# Patient Record
Sex: FEMALE | Race: WHITE | NOT HISPANIC OR LATINO | Employment: FULL TIME | URBAN - METROPOLITAN AREA
[De-identification: names, ages, dates, MRNs, and addresses within clinical notes are randomized per-mention and may not be internally consistent; named-entity substitution may affect disease eponyms.]

---

## 2023-03-16 DIAGNOSIS — I10 PRIMARY HYPERTENSION: Primary | ICD-10-CM

## 2023-03-16 RX ORDER — AMLODIPINE BESYLATE 5 MG/1
5 TABLET ORAL DAILY
Qty: 90 TABLET | Refills: 1 | Status: SHIPPED | OUTPATIENT
Start: 2023-03-16 | End: 2023-08-18 | Stop reason: SDUPTHER

## 2023-03-16 RX ORDER — LISINOPRIL 20 MG/1
20 TABLET ORAL DAILY
COMMUNITY
End: 2023-03-16 | Stop reason: SDUPTHER

## 2023-03-16 RX ORDER — LISINOPRIL 20 MG/1
20 TABLET ORAL DAILY
Qty: 90 TABLET | Refills: 1 | Status: SHIPPED | OUTPATIENT
Start: 2023-03-16 | End: 2023-08-18 | Stop reason: SDUPTHER

## 2023-03-16 RX ORDER — AMLODIPINE BESYLATE 5 MG/1
5 TABLET ORAL DAILY
COMMUNITY
End: 2023-03-16 | Stop reason: SDUPTHER

## 2023-04-15 LAB
ALLERGEN ANIMAL: CAT DANDER IGE (KU/L): <0.1 KU/L
ALLERGEN ANIMAL: DOG DANDER IGE (KU/L): <0.1 KU/L
ALLERGEN GRASS: BERMUDA GRASS (CYNODON DACTYLON) IGE (KU/L): <0.1 KU/L
ALLERGEN GRASS: JOHNSON GRASS (SORGHUM HALEPENSE) IGE (KU/L): <0.1 KU/L
ALLERGEN GRASS: MEADOW GRASS, KENTUCKY BLUE (POA PRATENSIS )IGE (KU/L): <0.1 KU/L
ALLERGEN GRASS: TIMOTHY GRASS (PHLEUM PRATENSE) IGE (KU/L): <0.1 KU/L
ALLERGEN INSECT: COCKROACH IGE: <0.1 KU/L
ALLERGEN MICROORGANISM: ALTERNARIA ALTERNATA IGE (KU/L): <0.1 KU/L
ALLERGEN MICROORGANISM: ASPERGILLUS FUMIGATUS IGE (KU/L): <0.1 KU/L
ALLERGEN MICROORGANISM: CLADOSPORIUM HERBARUM IGE (KU/L): <0.1 KU/L
ALLERGEN MICROORGANISM: PENICILLIUM CHRYSOGENUM (P. NOTATUM) IGE (KU/L): <0.1 KU/L
ALLERGEN MITE: DERMATOPHAGOIDES FARINAE (HOUSE DUST MITE) IGE (KU/L): <0.1 KU/L
ALLERGEN MITE: DERMATOPHAGOIDES PTERONYSSINUS (HOUSE DUST MITE) IGE (KU/L): <0.1 KU/L
ALLERGEN TREE: BOX-ELDER (ACER NEGUNDO) IGE (KU/L): <0.1 KU/L
ALLERGEN TREE: COMMON SILVER BIRCH (BETULA VERRUCOSA) IGE (KU/L): <0.1 KU/L
ALLERGEN TREE: COTTONWOOD (POPULUS DELTOIDES) IGE (KU/L): <0.1 KU/L
ALLERGEN TREE: ELM (ULMUS AMERICANA) IGE (KU/L): <0.1 KU/L
ALLERGEN TREE: MAPLE LEAF SYCAMORE, LONDON PLANE IGE (KU/L): <0.1 KU/L
ALLERGEN TREE: MOUNTAIN JUNIPER (JUNIPERUS SABINOIDES) IGE (KU/L): <0.1 KU/L
ALLERGEN TREE: MULBERRY (MORUS ALBA) IGE (KU/L): <0.1 KU/L
ALLERGEN TREE: OAK (QUERCUS ALBA) IGE (KU/L): <0.1 KU/L
ALLERGEN TREE: PECAN, HICKORY (CARYA PECAN) IGE (KU/L): <0.1 KU/L
ALLERGEN TREE: WALNUT IGE: <0.1 KU/L
ALLERGEN TREE: WHITE ASH (FRAXINUS AMERICANA) IGE (KU/L): <0.1 KU/L
ALLERGEN WEED: COMMON PIGWEED (AMARANTHUS RETROFLEXUS) IGE (KU/L): <0.1 KU/L
ALLERGEN WEED: COMMON RAGWEED (AMB. ARTEMISIIFOLIA/A. ELATIOR) IGE (KU/L): <0.1 KU/L
ALLERGEN WEED: GOOSEFOOT, LAMB'S QUARTERS (CHENOPODIUM ALBUM) IGE (KU/L): <0.1 KU/L
ALLERGEN WEED: PLANTAIN (ENGLISH), RIBWORT (PLANTAGO LANCEOLATA) IGE (KU/L): <0.1 KU/L
ALLERGEN WEED: PRICKLY SALTWORT/RUSSIAN THISTLE (SALSOLA KALI) IGE (KU/L): <0.1 KU/L
ALLERGEN WEED: SHEEP SORREL (RUMEX ACETOSELLA) IGE (KU/L): <0.1 KU/L
IMMUNOCAP IGE: <2 KU/L (ref 0–214)
IMMUNOCAP INTERPRETATION: NORMAL

## 2023-06-06 LAB
ANION GAP IN SER/PLAS: 12 MMOL/L (ref 10–20)
CALCIUM (MG/DL) IN SER/PLAS: 10.2 MG/DL (ref 8.6–10.6)
CARBON DIOXIDE, TOTAL (MMOL/L) IN SER/PLAS: 28 MMOL/L (ref 21–32)
CHLORIDE (MMOL/L) IN SER/PLAS: 103 MMOL/L (ref 98–107)
CHOLESTEROL (MG/DL) IN SER/PLAS: 242 MG/DL (ref 0–199)
CHOLESTEROL IN HDL (MG/DL) IN SER/PLAS: 106.2 MG/DL
CHOLESTEROL/HDL RATIO: 2.3
CREATININE (MG/DL) IN SER/PLAS: 0.73 MG/DL (ref 0.5–1.05)
ERYTHROCYTE DISTRIBUTION WIDTH (RATIO) BY AUTOMATED COUNT: 12.7 % (ref 11.5–14.5)
ERYTHROCYTE MEAN CORPUSCULAR HEMOGLOBIN CONCENTRATION (G/DL) BY AUTOMATED: 31.3 G/DL (ref 32–36)
ERYTHROCYTE MEAN CORPUSCULAR VOLUME (FL) BY AUTOMATED COUNT: 98 FL (ref 80–100)
ERYTHROCYTES (10*6/UL) IN BLOOD BY AUTOMATED COUNT: 4.34 X10E12/L (ref 4–5.2)
GFR FEMALE: >90 ML/MIN/1.73M2
GLUCOSE (MG/DL) IN SER/PLAS: 95 MG/DL (ref 74–99)
HEMATOCRIT (%) IN BLOOD BY AUTOMATED COUNT: 42.5 % (ref 36–46)
HEMOGLOBIN (G/DL) IN BLOOD: 13.3 G/DL (ref 12–16)
LDL: 127 MG/DL (ref 0–99)
LEUKOCYTES (10*3/UL) IN BLOOD BY AUTOMATED COUNT: 4.5 X10E9/L (ref 4.4–11.3)
NRBC (PER 100 WBCS) BY AUTOMATED COUNT: 0 /100 WBC (ref 0–0)
PLATELETS (10*3/UL) IN BLOOD AUTOMATED COUNT: 218 X10E9/L (ref 150–450)
POTASSIUM (MMOL/L) IN SER/PLAS: 4.3 MMOL/L (ref 3.5–5.3)
SODIUM (MMOL/L) IN SER/PLAS: 139 MMOL/L (ref 136–145)
TRIGLYCERIDE (MG/DL) IN SER/PLAS: 45 MG/DL (ref 0–149)
UREA NITROGEN (MG/DL) IN SER/PLAS: 24 MG/DL (ref 6–23)
VLDL: 9 MG/DL (ref 0–40)

## 2023-08-01 ENCOUNTER — OFFICE VISIT (OUTPATIENT)
Dept: PRIMARY CARE | Facility: CLINIC | Age: 63
End: 2023-08-01
Payer: COMMERCIAL

## 2023-08-01 VITALS
HEIGHT: 63 IN | DIASTOLIC BLOOD PRESSURE: 80 MMHG | SYSTOLIC BLOOD PRESSURE: 164 MMHG | HEART RATE: 71 BPM | BODY MASS INDEX: 23.74 KG/M2 | WEIGHT: 134 LBS | OXYGEN SATURATION: 100 %

## 2023-08-01 DIAGNOSIS — E55.9 VITAMIN D DEFICIENCY: ICD-10-CM

## 2023-08-01 DIAGNOSIS — I87.2 VENOUS INSUFFICIENCY (CHRONIC) (PERIPHERAL): Primary | ICD-10-CM

## 2023-08-01 DIAGNOSIS — I10 ESSENTIAL HYPERTENSION: ICD-10-CM

## 2023-08-01 DIAGNOSIS — H69.93 EUSTACHIAN TUBE DYSFUNCTION, BILATERAL: ICD-10-CM

## 2023-08-01 DIAGNOSIS — I77.3 ARTERIAL FIBROMUSCULAR DYSPLASIA (CMS-HCC): ICD-10-CM

## 2023-08-01 PROBLEM — J31.0 CHRONIC RHINITIS: Status: ACTIVE | Noted: 2023-08-01

## 2023-08-01 PROBLEM — R60.9 EDEMA: Status: ACTIVE | Noted: 2023-08-01

## 2023-08-01 PROBLEM — H61.22 CERUMEN DEBRIS ON TYMPANIC MEMBRANE OF LEFT EAR: Status: RESOLVED | Noted: 2023-08-01 | Resolved: 2023-08-01

## 2023-08-01 PROBLEM — J34.3 HYPERTROPHY OF INFERIOR NASAL TURBINATE: Status: ACTIVE | Noted: 2023-08-01

## 2023-08-01 PROBLEM — R92.8 ABNORMAL MAMMOGRAM: Status: ACTIVE | Noted: 2023-08-01

## 2023-08-01 PROBLEM — J34.2 NASAL SEPTAL DEVIATION: Status: ACTIVE | Noted: 2023-08-01

## 2023-08-01 PROBLEM — I70.90 ATHEROSCLEROSIS OF ARTERIES: Status: ACTIVE | Noted: 2023-08-01

## 2023-08-01 PROBLEM — I65.23 BILATERAL CAROTID ARTERY STENOSIS: Status: ACTIVE | Noted: 2023-08-01

## 2023-08-01 PROBLEM — F41.0 PANIC DISORDER: Status: ACTIVE | Noted: 2023-08-01

## 2023-08-01 PROBLEM — F41.9 ANXIETY: Status: ACTIVE | Noted: 2023-08-01

## 2023-08-01 PROBLEM — I83.93 VARICOSE VEINS OF LEGS: Status: ACTIVE | Noted: 2023-08-01

## 2023-08-01 PROBLEM — K62.89 PERIRECTAL SKIN IRRITATION: Status: RESOLVED | Noted: 2023-08-01 | Resolved: 2023-08-01

## 2023-08-01 PROBLEM — R76.8 ANA POSITIVE: Status: ACTIVE | Noted: 2023-08-01

## 2023-08-01 PROBLEM — L65.9 HAIR LOSS: Status: ACTIVE | Noted: 2023-08-01

## 2023-08-01 PROBLEM — N95.1 SYMPTOMATIC MENOPAUSAL OR FEMALE CLIMACTERIC STATES: Status: ACTIVE | Noted: 2023-08-01

## 2023-08-01 PROBLEM — I72.8 ANEURYSM, SPLENIC ARTERY (CMS-HCC): Status: ACTIVE | Noted: 2023-08-01

## 2023-08-01 PROBLEM — K63.5 BENIGN COLON POLYP: Status: ACTIVE | Noted: 2023-08-01

## 2023-08-01 PROCEDURE — 3077F SYST BP >= 140 MM HG: CPT | Performed by: STUDENT IN AN ORGANIZED HEALTH CARE EDUCATION/TRAINING PROGRAM

## 2023-08-01 PROCEDURE — 99214 OFFICE O/P EST MOD 30 MIN: CPT | Performed by: STUDENT IN AN ORGANIZED HEALTH CARE EDUCATION/TRAINING PROGRAM

## 2023-08-01 PROCEDURE — 1036F TOBACCO NON-USER: CPT | Performed by: STUDENT IN AN ORGANIZED HEALTH CARE EDUCATION/TRAINING PROGRAM

## 2023-08-01 PROCEDURE — 3079F DIAST BP 80-89 MM HG: CPT | Performed by: STUDENT IN AN ORGANIZED HEALTH CARE EDUCATION/TRAINING PROGRAM

## 2023-08-01 RX ORDER — FLUTICASONE PROPIONATE 50 MCG
2 SPRAY, SUSPENSION (ML) NASAL DAILY
COMMUNITY

## 2023-08-01 RX ORDER — AZELASTINE 1 MG/ML
SPRAY, METERED NASAL 2 TIMES DAILY
COMMUNITY
Start: 2022-06-29

## 2023-08-01 RX ORDER — NAPROXEN SODIUM 220 MG/1
1 TABLET, FILM COATED ORAL DAILY
COMMUNITY
Start: 2022-03-15

## 2023-08-01 RX ORDER — ELECTROLYTES/DEXTROSE
SOLUTION, ORAL ORAL
COMMUNITY
Start: 2022-06-10

## 2023-08-01 RX ORDER — CETIRIZINE HYDROCHLORIDE 10 MG/1
10 TABLET ORAL
COMMUNITY
Start: 2018-07-15

## 2023-08-01 ASSESSMENT — ENCOUNTER SYMPTOMS
SORE THROAT: 0
SINUS PRESSURE: 0
RESPIRATORY NEGATIVE: 1
LIGHT-HEADEDNESS: 0
GASTROINTESTINAL NEGATIVE: 1
FATIGUE: 0
HEADACHES: 0

## 2023-08-01 ASSESSMENT — PATIENT HEALTH QUESTIONNAIRE - PHQ9
1. LITTLE INTEREST OR PLEASURE IN DOING THINGS: NOT AT ALL
2. FEELING DOWN, DEPRESSED OR HOPELESS: NOT AT ALL
SUM OF ALL RESPONSES TO PHQ9 QUESTIONS 1 AND 2: 0

## 2023-08-01 NOTE — PROGRESS NOTES
Subjective   Patient ID: Siobhan Escobar is a 62 y.o. female who presents for Hypertension (Blood pressure elevated and has noticed some swelling in her ankles and feet/Would like her ears looked at).  Hx of FMD. Follows with specialist. Was recommended Crestor 5mg, was nervous about side effects. Has not started it yet but considering.     Has had swelling in her ankles in the past.     Not painful. Get better with elevating or exercise. Back to normal in the morning. No dyspnea or lightheadedness. Does not extend past her ankles.     BP ranging in the 120's-130's at home most of the time. Occasionally in the 140's. No headaches.     Some ear pressure the last couple days. Has not been using her flonase lately. Has this and azelastine at home.     UTD on mammogram. Has upcoming gyn appointment.                 Review of Systems   Constitutional:  Negative for fatigue.   HENT:  Positive for ear pain. Negative for sinus pressure and sore throat.    Eyes:  Negative for visual disturbance.   Respiratory: Negative.     Cardiovascular:  Positive for leg swelling.   Gastrointestinal: Negative.    Neurological:  Negative for light-headedness and headaches.   All other systems reviewed and are negative.      Objective   Physical Exam  Vitals reviewed.   HENT:      Head: Normocephalic.      Right Ear: Tympanic membrane, ear canal and external ear normal. There is no impacted cerumen.      Left Ear: Tympanic membrane, ear canal and external ear normal. There is no impacted cerumen.      Nose: No congestion.      Mouth/Throat:      Mouth: Mucous membranes are moist.      Pharynx: Oropharynx is clear.   Eyes:      Pupils: Pupils are equal, round, and reactive to light.   Pulmonary:      Effort: No respiratory distress.   Musculoskeletal:      Right lower leg: No edema.      Left lower leg: No edema.   Skin:     General: Skin is warm and dry.   Neurological:      General: No focal deficit present.      Mental Status: She is  alert. Mental status is at baseline.   Psychiatric:         Mood and Affect: Mood normal.         Behavior: Behavior normal.           Current Outpatient Medications:     amLODIPine (Norvasc) 5 mg tablet, Take 1 tablet (5 mg) by mouth once daily. for blood pressure, Disp: 90 tablet, Rfl: 1    aspirin 81 mg chewable tablet, Chew 1 tablet (81 mg) once daily., Disp: , Rfl:     azelastine (Astelin) 137 mcg (0.1 %) nasal spray, Administer into affected nostril(s) twice a day., Disp: , Rfl:     biotin 5 mg capsule, Take by mouth., Disp: , Rfl:     cetirizine (ZyrTEC) 10 mg tablet, Take 1 tablet (10 mg) by mouth once daily., Disp: , Rfl:     lisinopril 20 mg tablet, Take 1 tablet (20 mg) by mouth once daily., Disp: 90 tablet, Rfl: 1    ascorbic acid-multivit-min 1,000 mg powder effervescent in packet, Take by mouth once daily., Disp: , Rfl:     fluticasone (Flonase) 50 mcg/actuation nasal spray, Administer 2 sprays into each nostril once daily., Disp: , Rfl:     magnesium oxide-Mg AA chelate (Magnesium, oxide/AA chelate,) 300 mg capsule, Take by mouth., Disp: , Rfl:       Assessment/Plan   Diagnoses and all orders for this visit:  Venous insufficiency (chronic) (peripheral)  Comments:  discussed elevating legs when able  compression stockings for long periods of having legs down  Arterial fibromuscular dysplasia (CMS/HCC)  Comments:  follows with specialist  Vitamin D deficiency  Essential hypertension  Comments:  some white coat hypertension  BP well controlled at home  no medication changes at this time  Eustachian tube dysfunction, bilateral  Comments:  recommended consistently using flonase    Follow up in 6 months    Zora Henriquez DO

## 2023-08-18 ENCOUNTER — TELEPHONE (OUTPATIENT)
Dept: PRIMARY CARE | Facility: CLINIC | Age: 63
End: 2023-08-18
Payer: COMMERCIAL

## 2023-08-18 DIAGNOSIS — I10 ESSENTIAL HYPERTENSION: Primary | ICD-10-CM

## 2023-08-18 DIAGNOSIS — I10 PRIMARY HYPERTENSION: ICD-10-CM

## 2023-08-18 RX ORDER — AMLODIPINE BESYLATE 5 MG/1
5 TABLET ORAL DAILY
Qty: 90 TABLET | Refills: 2 | Status: SHIPPED | OUTPATIENT
Start: 2023-08-18 | End: 2024-05-31

## 2023-08-18 RX ORDER — LISINOPRIL 20 MG/1
20 TABLET ORAL DAILY
Qty: 90 TABLET | Refills: 2 | Status: SHIPPED | OUTPATIENT
Start: 2023-08-18 | End: 2024-05-31

## 2023-08-18 NOTE — TELEPHONE ENCOUNTER
Pt. Is requesting a switch over to Carvedilol from lisinopril due to hair loss.  She did some research and saw that carvedilol does not cause a drug induced hair loss and she has tried all OTC meds to help boost hair growth and thickness to no avail.   Would you be willing to try this change?    Request 90 days to local [facility] if you are in agreement

## 2023-08-21 RX ORDER — CARVEDILOL 6.25 MG/1
6.25 TABLET ORAL
Qty: 180 TABLET | Refills: 1 | Status: SHIPPED | OUTPATIENT
Start: 2023-08-21 | End: 2023-10-10

## 2023-08-22 NOTE — TELEPHONE ENCOUNTER
Patient states she rather just hold off on the carvedilol until she sees you in October. She is going to continue with lisinopril.

## 2023-10-10 ENCOUNTER — OFFICE VISIT (OUTPATIENT)
Dept: PRIMARY CARE | Facility: CLINIC | Age: 63
End: 2023-10-10
Payer: COMMERCIAL

## 2023-10-10 VITALS
SYSTOLIC BLOOD PRESSURE: 150 MMHG | BODY MASS INDEX: 23.39 KG/M2 | HEIGHT: 63 IN | WEIGHT: 132 LBS | DIASTOLIC BLOOD PRESSURE: 80 MMHG | OXYGEN SATURATION: 99 % | HEART RATE: 70 BPM

## 2023-10-10 DIAGNOSIS — L65.9 HAIR LOSS: ICD-10-CM

## 2023-10-10 DIAGNOSIS — L60.3 BRITTLE NAILS: ICD-10-CM

## 2023-10-10 DIAGNOSIS — I10 ESSENTIAL HYPERTENSION: Primary | ICD-10-CM

## 2023-10-10 DIAGNOSIS — I77.3 ARTERIAL FIBROMUSCULAR DYSPLASIA (CMS-HCC): ICD-10-CM

## 2023-10-10 DIAGNOSIS — F41.8 ANXIETY ABOUT HEALTH: ICD-10-CM

## 2023-10-10 PROCEDURE — 3079F DIAST BP 80-89 MM HG: CPT | Performed by: STUDENT IN AN ORGANIZED HEALTH CARE EDUCATION/TRAINING PROGRAM

## 2023-10-10 PROCEDURE — 3077F SYST BP >= 140 MM HG: CPT | Performed by: STUDENT IN AN ORGANIZED HEALTH CARE EDUCATION/TRAINING PROGRAM

## 2023-10-10 PROCEDURE — 1036F TOBACCO NON-USER: CPT | Performed by: STUDENT IN AN ORGANIZED HEALTH CARE EDUCATION/TRAINING PROGRAM

## 2023-10-10 PROCEDURE — 99213 OFFICE O/P EST LOW 20 MIN: CPT | Performed by: STUDENT IN AN ORGANIZED HEALTH CARE EDUCATION/TRAINING PROGRAM

## 2023-10-10 ASSESSMENT — PATIENT HEALTH QUESTIONNAIRE - PHQ9
SUM OF ALL RESPONSES TO PHQ9 QUESTIONS 1 AND 2: 0
1. LITTLE INTEREST OR PLEASURE IN DOING THINGS: NOT AT ALL
2. FEELING DOWN, DEPRESSED OR HOPELESS: NOT AT ALL

## 2023-10-10 ASSESSMENT — ENCOUNTER SYMPTOMS
RESPIRATORY NEGATIVE: 1
FATIGUE: 0
SLEEP DISTURBANCE: 0
CARDIOVASCULAR NEGATIVE: 1
NERVOUS/ANXIOUS: 1
GASTROINTESTINAL NEGATIVE: 1

## 2023-10-10 NOTE — PROGRESS NOTES
Subjective   Patient ID: Siobhan Escobar is a 62 y.o. female who presents for Follow-up (Medication follow up , worried about hair loss/Was at dentist earlier so thinks that is why her bp is elevated).  Recently had COVID 3 weeks ago. Happened after trip to [place]. No breathing difficulties.    Symptoms lingered for several weeks. Just recently started feeling like herself again.     Was using flonase and azelastine.     BP at home has been in the 120/70's. High today but was at the dentist right before this.     Wants to know what supplements she can try for hair loss.     Concerned about her nails. States they have been more brittle. Wants to know if she is deficient in anything.     FMD. Follows with vascular. Has been recommended statin before. Has been hesitant about trying this.           Review of Systems   Constitutional:  Negative for fatigue.   HENT: Negative.     Eyes:  Negative for visual disturbance.   Respiratory: Negative.     Cardiovascular: Negative.    Gastrointestinal: Negative.    Psychiatric/Behavioral:  Negative for sleep disturbance. The patient is nervous/anxious.    All other systems reviewed and are negative.      Objective   Physical Exam  Vitals reviewed.   Constitutional:       Appearance: Normal appearance.   HENT:      Head: Normocephalic.      Mouth/Throat:      Mouth: Mucous membranes are moist.   Eyes:      Pupils: Pupils are equal, round, and reactive to light.   Cardiovascular:      Rate and Rhythm: Normal rate and regular rhythm.   Pulmonary:      Effort: Pulmonary effort is normal. No respiratory distress.      Breath sounds: No wheezing or rhonchi.   Musculoskeletal:         General: Normal range of motion.   Skin:     General: Skin is warm and dry.   Neurological:      General: No focal deficit present.      Mental Status: She is alert. Mental status is at baseline.   Psychiatric:         Mood and Affect: Mood normal.         Behavior: Behavior normal.         Body mass index  is 23.38 kg/m².      Current Outpatient Medications:     amLODIPine (Norvasc) 5 mg tablet, Take 1 tablet (5 mg) by mouth once daily. for blood pressure, Disp: 90 tablet, Rfl: 2    ascorbic acid-multivit-min 1,000 mg powder effervescent in packet, Take by mouth once daily., Disp: , Rfl:     aspirin 81 mg chewable tablet, Chew 1 tablet (81 mg) once daily., Disp: , Rfl:     azelastine (Astelin) 137 mcg (0.1 %) nasal spray, Administer into affected nostril(s) twice a day., Disp: , Rfl:     biotin 5 mg capsule, Take by mouth., Disp: , Rfl:     cetirizine (ZyrTEC) 10 mg tablet, Take 1 tablet (10 mg) by mouth once daily., Disp: , Rfl:     fluticasone (Flonase) 50 mcg/actuation nasal spray, Administer 2 sprays into each nostril once daily., Disp: , Rfl:     lisinopril 20 mg tablet, Take 1 tablet (20 mg) by mouth once daily., Disp: 90 tablet, Rfl: 2    magnesium oxide-Mg AA chelate (Magnesium, oxide/AA chelate,) 300 mg capsule, Take by mouth., Disp: , Rfl:     Assessment/Plan   Diagnoses and all orders for this visit:  Essential hypertension  Comments:  BP well controlled  elevated in office today  home log reviewed  continue lisinopril and amlodipine  Arterial fibromuscular dysplasia (CMS/HCC)  Comments:  follows with vascular specialist  has appt in spring with repeat imaging  Anxiety about health  Comments:  reassurance provided  Hair loss  Comments:  using otc hair supplements  nervous about trying minoxodil  Brittle nails    Follow up in 3 months for PENNY Henriquez DO

## 2024-04-11 DIAGNOSIS — M25.511 ACUTE PAIN OF RIGHT SHOULDER: ICD-10-CM

## 2024-04-16 ENCOUNTER — HOSPITAL ENCOUNTER (OUTPATIENT)
Dept: RADIOLOGY | Facility: CLINIC | Age: 64
Discharge: HOME | End: 2024-04-16
Payer: COMMERCIAL

## 2024-04-16 ENCOUNTER — OFFICE VISIT (OUTPATIENT)
Dept: ORTHOPEDIC SURGERY | Facility: CLINIC | Age: 64
End: 2024-04-16
Payer: COMMERCIAL

## 2024-04-16 VITALS — BODY MASS INDEX: 22.53 KG/M2 | WEIGHT: 132 LBS | HEIGHT: 64 IN

## 2024-04-16 DIAGNOSIS — S46.811A TRAPEZIUS STRAIN, RIGHT, INITIAL ENCOUNTER: ICD-10-CM

## 2024-04-16 DIAGNOSIS — S16.1XXA CERVICAL STRAIN, ACUTE, INITIAL ENCOUNTER: ICD-10-CM

## 2024-04-16 DIAGNOSIS — M25.511 ACUTE PAIN OF RIGHT SHOULDER: ICD-10-CM

## 2024-04-16 PROCEDURE — 1159F MED LIST DOCD IN RCRD: CPT | Performed by: ORTHOPAEDIC SURGERY

## 2024-04-16 PROCEDURE — 73030 X-RAY EXAM OF SHOULDER: CPT | Mod: RIGHT SIDE | Performed by: STUDENT IN AN ORGANIZED HEALTH CARE EDUCATION/TRAINING PROGRAM

## 2024-04-16 PROCEDURE — 73030 X-RAY EXAM OF SHOULDER: CPT | Mod: RT

## 2024-04-16 PROCEDURE — 1036F TOBACCO NON-USER: CPT | Performed by: ORTHOPAEDIC SURGERY

## 2024-04-16 PROCEDURE — 99203 OFFICE O/P NEW LOW 30 MIN: CPT | Performed by: ORTHOPAEDIC SURGERY

## 2024-04-16 PROCEDURE — 1160F RVW MEDS BY RX/DR IN RCRD: CPT | Performed by: ORTHOPAEDIC SURGERY

## 2024-04-17 NOTE — PROGRESS NOTES
63-year-old is seen with right shoulder pain.  She has been having persistent severe sharp shooting pain in the right shoulder is worse with overhead reaching and lifting activities.  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  Pain is along the right-sided cervical paraspinal muscles and trapezius.  She has had pain for 2 months.  No traumatic event.  She is taken Advil and Tylenol.  She has had massage.    Pleasant in no acute distress.  Both shoulders forward flex 180 degrees.  Is good forward flexion and internal/external rotation strength.  No effusion or instability.  There is tenderness on the trapezius and right-sided cervical paraspinal muscles and there is discomfort with range of motion.    Multiple x-ray views of the right shoulder are personally reviewed and there is no acute bony abnormality.    Discussion about the trapezial and cervical paraspinal muscle strain was done.  She will perform physical therapy.  She can use ibuprofen.  She will avoid aggravating activities.  If she had persistent symptoms then further imaging of the cervical spine could be done.

## 2024-05-02 ENCOUNTER — APPOINTMENT (OUTPATIENT)
Dept: PHYSICAL THERAPY | Facility: CLINIC | Age: 64
End: 2024-05-02
Payer: COMMERCIAL

## 2024-05-06 ENCOUNTER — OFFICE VISIT (OUTPATIENT)
Dept: ORTHOPEDIC SURGERY | Facility: CLINIC | Age: 64
End: 2024-05-06
Payer: COMMERCIAL

## 2024-05-06 ENCOUNTER — EVALUATION (OUTPATIENT)
Dept: PHYSICAL THERAPY | Facility: CLINIC | Age: 64
End: 2024-05-06
Payer: COMMERCIAL

## 2024-05-06 VITALS — BODY MASS INDEX: 22.53 KG/M2 | HEIGHT: 64 IN | WEIGHT: 132 LBS

## 2024-05-06 DIAGNOSIS — M19.042 ARTHRITIS OF BOTH HANDS: Primary | ICD-10-CM

## 2024-05-06 DIAGNOSIS — M19.041 ARTHRITIS OF BOTH HANDS: Primary | ICD-10-CM

## 2024-05-06 DIAGNOSIS — S16.1XXA CERVICAL STRAIN, ACUTE, INITIAL ENCOUNTER: ICD-10-CM

## 2024-05-06 DIAGNOSIS — S46.811A TRAPEZIUS STRAIN, RIGHT, INITIAL ENCOUNTER: Primary | ICD-10-CM

## 2024-05-06 PROCEDURE — 97110 THERAPEUTIC EXERCISES: CPT | Mod: GP | Performed by: PHYSICAL THERAPIST

## 2024-05-06 PROCEDURE — 1160F RVW MEDS BY RX/DR IN RCRD: CPT | Performed by: ORTHOPAEDIC SURGERY

## 2024-05-06 PROCEDURE — 99213 OFFICE O/P EST LOW 20 MIN: CPT | Performed by: ORTHOPAEDIC SURGERY

## 2024-05-06 PROCEDURE — 1159F MED LIST DOCD IN RCRD: CPT | Performed by: ORTHOPAEDIC SURGERY

## 2024-05-06 PROCEDURE — 97161 PT EVAL LOW COMPLEX 20 MIN: CPT | Mod: GP | Performed by: PHYSICAL THERAPIST

## 2024-05-06 PROCEDURE — 1036F TOBACCO NON-USER: CPT | Performed by: ORTHOPAEDIC SURGERY

## 2024-05-06 NOTE — PROGRESS NOTES
Subjective    Patient ID: Siobhan Escobar is a 63 y.o. female.    Chief Complaint: Pain of the Right Hand (X2+YRS/RT>LT) and Pain of the Left Hand (X2+YRS)     Last Surgery: No surgery found  Last Surgery Date: No surgery found    HPI  Patient is a 63-year-old right-hand-dominant female who comes in with a complaint of bilateral hand pain.  This is been present for approximately 2 years.  Her symptoms are worse early in the mornings.  She denies numbness, tingling or any sort of paresthesia.  Her symptoms tend to improve throughout the day with use.  She has not paid attention if ibuprofen has helped with her symptoms.  She does notice some weakness with opening jars.    Objective   Ortho Exam  Patient is otherwise healthy female in no acute distress.  Exam of her right hand and wrist reveals her skin envelope is intact.  She does have clinical swelling at the third MCP joint.  There is no warmth erythema.  There is no evidence of infection.  She had adequate range of motion in her fingers.    Exam of her left hand and wrist reveals her skin envelope is intact.  Again she has clinical evidence of swelling at the third MCP joint.  She also has radial subluxation of the first metacarpal base at the first CMC joint.    Assessment/Plan   Encounter Diagnoses:  Arthritis of both hands    Patient has clinical evidence of bilateral hand arthritis.  At this time I explained to the patient her best course of action would be to use anti-inflammatory by either pills or with gels.  She will follow-up as her symptoms dictate.

## 2024-05-06 NOTE — PROGRESS NOTES
Physical Therapy    Physical Therapy Evaluation    Patient Name: Siobhan Escobar  MRN: [id number]  Today's Date: 5/6/2024  Time Calculation  Start Time: 1555  Stop Time: 1640  Time Calculation (min): 45 min     Problem List Items Addressed This Visit             ICD-10-CM    Cervical strain, acute, initial encounter S16.1XXA    Relevant Orders    Follow Up In Physical Therapy    Trapezius strain, right, initial encounter - Primary S46.811A    Relevant Orders    Follow Up In Physical Therapy       Insurance:  Visit number: 1 of 50  Insurance Type: Payor: ANTHEM / Plan: ANTHEM HMP / Product Type: *No Product type* /   Authorization or Plan of Care date Range:$35 COPAY 50 PT/OT/ST V PCY 0 USED NO AUTH NEEDES PAYS %     General:  Reason for visit: R sided neck and shoulder pain , xray R shoulder is WNL  Referred by: Dr Tashi Novak MD appt:  5/7/24     Precautions:  Skin CA, CAD, HTN, vascualr disease, anemia, OA of hands.  Fall Risk: None     Assessment:   Subacute Localized Right sided neck pain and limited neck R Rot,  R SB, and Extension.  Negative radiculopathy cluster.  R shoulder cleared.    Impairments: Pain, Active ROM, Passive ROM, Joint mobility, and Posture    Functional Limitations: Turning head    Recommended Treatment:  Therapeutic exercise, Manual therapy, Home program instruction and progression, Neuromuscular re-education, Therapeutic activities, Self care and home management, Instruction in activity modification, Dry Needling, and Traction      Plan:  Plan of care was developed with input and agreement by the patient.  0-2 x week x 6 weeks.    Rehab Potential:   Good to achieve goals.    Goals:  Short Term Goals:   -Patient to be Indep with Home Exercise Program for symptom management.      Long Term Goals:   -NDI: 0-10% impairment to indicate a significant improvement in overall function.      -DNF endurance test = 10 seconds to allow for correct cervical mechanics.    -Patient will  demonstrate correct posture with min to no cueing to allow for correct loading strategy.    -Patient will demo mild to no limitation AROM of the cervical spine to allow for correct mechanics with functional mobility.      -Patient will report driving without pain to allow for return to work and ADLs without limitation.     -Patient will report reading >30 min without pain to allow for return to work and ADLs without limitation.     Subjective:  CC:  R sided neck / UT pain.  Occasionally L side.  Worse in am.  Better as day progresses.  Right handed.    NAYA:  Insidious.  DOI: 2/2024  PAIN - Location: R cervical paraspinals and R UT.  Current: 4-5/10  Worst(past 24 hours): 5  Least(past 24 hours): 1  Pain Quality: sharp and tightness  PAIN - Alleviating:  Ibuprofen in am   Aggravating:  mornings, lifting, reaching, turning to R.  CURRENT MEDICAL MANAGEMENT: xray, massage( [place])   PLOF: Any neck issues in the past have subsided with a few days/week.  FUNCTIONAL LIMITATIONS: Lifting   LIVING ENVIRONMENT:  lives with retired .  WORK:  .   3 screens at work, 2 screen at home.  EXERCISE:  [place] or stairs.  Exercises 2 x per week.  Patient Stated Goal: alleviate chief complaint of: Right sided neck pain    Medical History Form: Reviewed (scanned into chart)    Objective:   - POSTURE:   Posture assessment positive for: forward head kyphotic.  Posture is flexible.  Protruded head: Yes;       - DERMATIOMES UE:   Patient denies altered sensation in the BUE extremities.    - PALPATION: She is point tender at the R UT and levator.  Lower cervical paraspinals are also point tender.    - NECK AROM MOVEMENT LOSS:  Ext causes 8/10 pain.  R SB, R Rot are limited.  Protrusion: Nil  Flexion: Nil  Retraction: Minimal  Extension: Major  Sidebending(R): Nil.      Sidebending (L): Minimal.  Contralateral stretch.  Rotation (R): Moderate  Rotation (L): Nil    - UPPER EXTREMITY MUSCLE STRENGTH:  Upper Extremity Myotomes are WNL  "bilaterally    Flexibility:   FLEXIBILITY NECK and UE: Upper Trapezius R/L:  Minimal limitation/WFL    Additional Special Testing:  Special Test Cervical: .  Neck distraction: Not tested  Spurling R/L:  negative / negative  ULTT A R/L:  negative / negative  Rotation less than 60 degrees R/L: positive  / negative     Outcome Measures:  Other Measures  Neck Disability Index: 16     Treatment Performed: (\"NP\" = Not Performed)     Therapeutic Exercise:     25 minutes  Home exercise program instructed and issued.  Access Code: [email or web address]    Seated ext using towel roll, \"rabbit ears\"    Seated Thoracic Lumbar Extension  - 10 reps - 3-5 second hold  Seated Levator Scapulae Stretch  - 7 x weekly - 3 reps - 30 seconds hold  Seated Upper Trapezius Stretch  -  3 reps - 30 second hold  Seated Assisted Cervical Rotation with Towel  - 1 sets - 5 reps - 5 second hold    Manual Therapy:       minutes      Neuromuscular Re-education:      minutes      Gait Training:            minutes      Aquatics:            minutes      Therapeutic Activity:      minutes      Modalities:       Vasopneumatic Device       minutes  Electrical Stimulation          minutes  Ultrasound            minutes  Iontophoresis                     minutes  Cold Pack            minutes  Mechanical Traction           minutes    Self Care Home Management:    minutes    Canalith Reposition:          minutes     Education:          minutes    Other:       minutes      Evaluation Complexity: Low: 20 minutes; Moderate   minutes; Complex PT Evaluation Time Entry: 20;  minutes    Re-Evaluation:   minutes    "

## 2024-05-07 PROBLEM — S16.1XXA CERVICAL STRAIN, ACUTE, INITIAL ENCOUNTER: Status: ACTIVE | Noted: 2024-05-07

## 2024-05-07 PROBLEM — S46.811A TRAPEZIUS STRAIN, RIGHT, INITIAL ENCOUNTER: Status: ACTIVE | Noted: 2024-05-07

## 2024-05-09 ENCOUNTER — TREATMENT (OUTPATIENT)
Dept: PHYSICAL THERAPY | Facility: CLINIC | Age: 64
End: 2024-05-09
Payer: COMMERCIAL

## 2024-05-09 DIAGNOSIS — S16.1XXA CERVICAL STRAIN, ACUTE, INITIAL ENCOUNTER: Primary | ICD-10-CM

## 2024-05-09 DIAGNOSIS — S46.811A TRAPEZIUS STRAIN, RIGHT, INITIAL ENCOUNTER: ICD-10-CM

## 2024-05-09 PROCEDURE — 97110 THERAPEUTIC EXERCISES: CPT | Mod: GP | Performed by: PHYSICAL THERAPIST

## 2024-05-09 PROCEDURE — 97140 MANUAL THERAPY 1/> REGIONS: CPT | Mod: GP | Performed by: PHYSICAL THERAPIST

## 2024-05-09 NOTE — PROGRESS NOTES
"                                                                                                                         PHYSICAL THERAPY TREATMENT NOTE    Patient Name:  Siobhan Escobar   Patient MRN: [id number]  Date: 05/09/24  Time Calculation  Start Time: 0530  Stop Time: 0615  Time Calculation (min): 45 min      Problem List Items Addressed This Visit             ICD-10-CM    Cervical strain, acute, initial encounter - Primary S16.1XXA    Trapezius strain, right, initial encounter S46.811A       Insurance:  Visit number: 2of 50  Insurance Type: Payor: ANTHEM / Plan: ANTHEM HMP / Product Type: *No Product type* /   Authorization or Plan of Care date Range:$35 COPAY 50 PT/OT/ST V PCY 0 USED NO AUTH NEEDES PAYS %      General:  Reason for visit: Subacute Localized Right sided neck pain and limited neck R Rot,  R SB, and Extension.  Negative radiculopathy cluster.  R shoulder cleared  xray R shoulder is WNL  Referred by: Dr Tashi Novak MD appt:  5/7/24      Precautions:  Skin CA, CAD, HTN, vascualr disease, anemia, OA of hands.  Fall Risk: None    Assessment:  Education: Reviewed home exercise program.  Progress towards functional goals:  increased neck pain but better able to tolerate Randi class  Response to interventions: Patient tolerated therapeutic interventions well and without any adverse events. Tolerated treatment session well without any increase in pain. Improved joint mobility.   Justification for continued skilled care: To address remaining functional, objective and subjective deficits to allow them to return to full independence with ADLs.    Plan:  Manual therapy to improve joint mobility.    Subjective:   Siobhan reports she feels like her condition is worsening. Increased neck pain since starting therapy exercises.   Pain (0-10): 6    HEP adherence / understanding: compliance with the instructed home exercises.    Objective:      Treatment Performed: (\"NP\" = Not Performed)     Therapeutic " "Exercise:     15 minutes  Access Code: [email or web address]    Seated ext using towel roll, \"rabbit ears\"    Seated Thoracic Lumbar Extension  - 10 reps - 3-5 second hold  Seated Levator Scapulae Stretch  - 7 x weekly - 3 reps - 30 seconds hold  Seated Upper Trapezius Stretch  -  3 reps - 30 second hold  Seated Assisted Cervical Rotation with Towel  - 1 sets - 5 reps - 5 second hold    Manual Therapy:     30 minutes  Seated STM/DTM to the R UT/cervical paraspinals  Suboccipital release  Prone UPA R and L C5-7    Neuromuscular Re-education:      minutes      Gait Training:            minutes      Aquatics:            minutes      Therapeutic Activity:      minutes      Gait Training:            minutes      Modalities:       Vasopneumatic Device       minutes  Electrical Stimulation          minutes  Ultrasound            minutes  Iontophoresis                     minutes  Cold Pack            minutes  Mechanical Traction           minutes    Self Care Home Management:    minutes    Canalith Reposition:          minutes     Education:          minutes    Other:       minutes      Evaluation Complexity: Low:   minutes; Moderate   minutes; Complex   minutes    Re-Evaluation:   minutes           "

## 2024-05-13 ENCOUNTER — APPOINTMENT (OUTPATIENT)
Dept: PHYSICAL THERAPY | Facility: CLINIC | Age: 64
End: 2024-05-13
Payer: COMMERCIAL

## 2024-05-16 ENCOUNTER — TREATMENT (OUTPATIENT)
Dept: PHYSICAL THERAPY | Facility: CLINIC | Age: 64
End: 2024-05-16
Payer: COMMERCIAL

## 2024-05-16 DIAGNOSIS — S16.1XXA CERVICAL STRAIN, ACUTE, INITIAL ENCOUNTER: Primary | ICD-10-CM

## 2024-05-16 DIAGNOSIS — S46.811A TRAPEZIUS STRAIN, RIGHT, INITIAL ENCOUNTER: ICD-10-CM

## 2024-05-16 PROCEDURE — 97110 THERAPEUTIC EXERCISES: CPT | Mod: GP | Performed by: PHYSICAL THERAPIST

## 2024-05-16 PROCEDURE — 97140 MANUAL THERAPY 1/> REGIONS: CPT | Mod: GP | Performed by: PHYSICAL THERAPIST

## 2024-05-16 NOTE — PROGRESS NOTES
PHYSICAL THERAPY TREATMENT NOTE    Patient Name:  Siobhan Escobar   Patient MRN: [id number]  Date: 05/16/24  Time Calculation  Start Time: 0545  Stop Time: 0630  Time Calculation (min): 45 min      Problem List Items Addressed This Visit             ICD-10-CM    Cervical strain, acute, initial encounter - Primary S16.1XXA    Trapezius strain, right, initial encounter S46.811A       Insurance:  Visit number: 3of 50  Insurance Type: Payor: ANTHEM / Plan: ANTHEM HMP / Product Type: *No Product type* /   Authorization or Plan of Care date Range:$35 COPAY 50 PT/OT/ST V PCY 0 USED NO AUTH NEEDES PAYS %      General:  Reason for visit: Subacute Localized Right sided neck pain and limited neck R Rot,  R SB, and Extension.  Negative radiculopathy cluster.  R shoulder cleared  xray R shoulder is WNL  Referred by: Dr Tashi Novak MD appt:  5/7/24      Precautions:  Skin CA, CAD, HTN, vascualr disease, anemia, OA of hands.  Fall Risk: None    Assessment:  Education: Home exercise program instructed and issued.  Progress towards functional goals: Reduced frequency of pain. Reduced intensity of pain. Reduced pain level.  Response to interventions: Patient tolerated therapeutic interventions well and without any adverse events. Tolerated treatment session well without any increase in pain.  Justification for continued skilled care: To address remaining functional, objective and subjective deficits to allow them to return to full independence with ADLs.    Plan:  Continued education on techniques such as ice, compression and elevation to manage pain and swelling. Exercises targeting surrounding musculature to stabilize the joint and improve function. Manual therapy to improve joint mobility.    Subjective:   Siobhan reports she feels like her condition is improving. Pain improved.  Not as bad in morning.   "Was 8/10 in morning, 4/10 now.  Patient reports:  Reduced intensity of pain. Reduced pain level.   Pain (0-10): 0    HEP adherence / understanding: compliance with the instructed home exercises.    Objective:      Treatment Performed: (\"NP\" = Not Performed)     Therapeutic Exercise:     30 minutes  Access Code: [email or web address]    Seated ext using towel roll, \"rabbit ears\"    Seated Thoracic Lumbar Extension  - 10 reps - 3-5 second hold  Seated Levator Scapulae Stretch  - 7 x weekly - 3 reps - 30 seconds hold  Seated Upper Trapezius Stretch  -  3 reps - 30 second hold  Seated Assisted Cervical Rotation with Towel  - 1 sets - 5 reps - 5 second hold  Tband Row/LAE/ER/Horiz Abd 2 x 10 ea    Manual Therapy:     15 minutes  Seated STM/DTM to the R UT/cervical paraspinals  Suboccipital release  Prone UPA R and L C5-7    Neuromuscular Re-education:      minutes      Gait Training:            minutes      Aquatics:            minutes      Therapeutic Activity:      minutes      Gait Training:            minutes      Modalities:       Vasopneumatic Device       minutes  Electrical Stimulation          minutes  Ultrasound            minutes  Iontophoresis                     minutes  Cold Pack            minutes  Mechanical Traction           minutes    Self Care Home Management:    minutes    Canalith Reposition:          minutes     Education:          minutes    Other:       minutes      Evaluation Complexity: Low:   minutes; Moderate   minutes; Complex   minutes    Re-Evaluation:   minutes           "

## 2024-05-31 DIAGNOSIS — I10 PRIMARY HYPERTENSION: ICD-10-CM

## 2024-05-31 RX ORDER — LISINOPRIL 20 MG/1
20 TABLET ORAL DAILY
Qty: 90 TABLET | Refills: 0 | Status: SHIPPED | OUTPATIENT
Start: 2024-05-31

## 2024-05-31 RX ORDER — AMLODIPINE BESYLATE 5 MG/1
5 TABLET ORAL DAILY
Qty: 90 TABLET | Refills: 0 | Status: SHIPPED | OUTPATIENT
Start: 2024-05-31

## 2024-06-04 ENCOUNTER — TREATMENT (OUTPATIENT)
Dept: PHYSICAL THERAPY | Facility: CLINIC | Age: 64
End: 2024-06-04
Payer: COMMERCIAL

## 2024-06-04 DIAGNOSIS — S46.811A TRAPEZIUS STRAIN, RIGHT, INITIAL ENCOUNTER: Primary | ICD-10-CM

## 2024-06-04 DIAGNOSIS — S16.1XXA CERVICAL STRAIN, ACUTE, INITIAL ENCOUNTER: ICD-10-CM

## 2024-06-04 PROCEDURE — 97140 MANUAL THERAPY 1/> REGIONS: CPT | Mod: GP | Performed by: PHYSICAL THERAPIST

## 2024-06-04 NOTE — PROGRESS NOTES
PHYSICAL THERAPY TREATMENT NOTE    Patient Name:  Siobhan Escobar   Patient MRN: [id number]  Date: 06/04/24  Time Calculation  Start Time: 1444  Stop Time: 1538  Time Calculation (min): 54 min      Problem List Items Addressed This Visit             ICD-10-CM    Cervical strain, acute, initial encounter S16.1XXA    Trapezius strain, right, initial encounter - Primary S46.811A     Insurance:  Visit number: 4of 50  Insurance Type: Payor: ANTHEM / Plan: ANTHEM HMP / Product Type: *No Product type* /   Authorization or Plan of Care date Range:$35 COPAY 50 PT/OT/ST V PCY 0 USED NO AUTH NEEDES PAYS %     General:  Reason for visit: Subacute Localized Right sided neck pain and limited neck R Rot,  R SB, and Extension.  Negative radiculopathy cluster.  R shoulder cleared  xray R shoulder is WNL  Referred by: Dr Tashi Novak MD appt:  5/7/24     Precautions:  Skin CA, CAD, HTN, vascualr disease, anemia, OA of hands.  Fall Risk: None    Assessment:    Siobhan Escobar has completed 4 physical therapy sessions from 5/6/2024 to 6/4/24 to address Subacute Localized Right sided neck pain and limited neck ROM.  Treatment has included Therapeutic exercise, Manual therapy, Home program instruction and progression, and Self care and home management.  she reports compliance with the instructed home exercises..  Siobhan has made significant progress towards the established therapy goals.  At this time I recommend Siobhan continue with physical therapy to address the following impairments: Pain, Active ROM, Strength, Motor function/control, Joint mobility, and Posture.  With treatment to include Therapeutic exercise, Manual therapy, Home program instruction and progression, Neuromuscular re-education, Therapeutic activities, and Dry Needling.  Dry needling handout was issued.  She as asked to hold off on tbands x 1  "week to determine if that reduces her pain.    Outcome Measures:        Subjective:    2 weeks ago she was doing well.  Her pain was comfortable, not annoying.  About 1 week ago her pain increased and seems to have reverted back to what it was at onset of symptoms.  Nonspecific aggravation of symptoms.    She has done a little gardening, which may have irritated the neck.  She continues to do [employer] comfortably 2 days/week.   Pain (0-10): 7 /10 mid and lower R cervical paraspinals.        Objective:  Cervical ROM:   Flexion is not limited but it is painful  Extension is limited and painful  Rotation is slightly limited to R compared to L with minimal pain  SB is symmetrical    She is point tender to palpation at the R cervical paraspinals, levator, and UT.    Treatment Performed: (\"NP\" = Not Performed)     Therapeutic Exercise:       minutes  Access Code: [email or web address]    Seated ext using towel roll, \"rabbit ears\"    Seated Thoracic Lumbar Extension  - 10 reps - 3-5 second hold  Seated Levator Scapulae Stretch  - 7 x weekly - 3 reps - 30 seconds hold  Seated Upper Trapezius Stretch  -  3 reps - 30 second hold  Seated Assisted Cervical Rotation with Towel  - 1 sets - 5 reps - 5 second hold  Tband Row/LAE/ER/Horiz Abd 2 x 10 ea      Manual Therapy:     45 minutes  Seated STM/DTM to the R UT/cervical paraspinals  Suboccipital release  Prone UPA R and L C4 - T2    Neuromuscular Re-education:      minutes      Gait Training:            minutes      Aquatics:            minutes      Therapeutic Activity:      minutes      Gait Training:            minutes      Modalities:       Vasopneumatic Device       minutes  Electrical Stimulation          minutes  Ultrasound            minutes  Iontophoresis                     minutes  Cold Pack            minutes  Mechanical Traction           minutes    Self Care Home Management:    minutes    Canalith Reposition:          minutes     Education:          minutes    Other:       " minutes      Evaluation Complexity: Low:   minutes; Moderate   minutes; Complex   minutes    Re-Evaluation:   minutes    .[email or web address]

## 2024-06-06 DIAGNOSIS — I77.3 FIBROMUSCULAR DYSPLASIA (CMS-HCC): ICD-10-CM

## 2024-06-06 DIAGNOSIS — I10 PRIMARY HYPERTENSION: ICD-10-CM

## 2024-06-06 DIAGNOSIS — Z00.00 HEALTH CARE MAINTENANCE: ICD-10-CM

## 2024-06-13 ENCOUNTER — TREATMENT (OUTPATIENT)
Dept: PHYSICAL THERAPY | Facility: CLINIC | Age: 64
End: 2024-06-13
Payer: COMMERCIAL

## 2024-06-13 DIAGNOSIS — S16.1XXA CERVICAL STRAIN, ACUTE, INITIAL ENCOUNTER: ICD-10-CM

## 2024-06-13 DIAGNOSIS — S46.811A TRAPEZIUS STRAIN, RIGHT, INITIAL ENCOUNTER: Primary | ICD-10-CM

## 2024-06-13 PROCEDURE — 97140 MANUAL THERAPY 1/> REGIONS: CPT | Mod: GP | Performed by: PHYSICAL THERAPIST

## 2024-06-13 PROCEDURE — 97110 THERAPEUTIC EXERCISES: CPT | Mod: GP | Performed by: PHYSICAL THERAPIST

## 2024-06-13 NOTE — PROGRESS NOTES
PHYSICAL THERAPY TREATMENT NOTE    Patient Name:  Siobhan Escobar   Patient MRN: [id number]  Date: 06/13/24  Time Calculation  Start Time: 0405  Stop Time: 0445  Time Calculation (min): 40 min      Problem List Items Addressed This Visit             ICD-10-CM    Cervical strain, acute, initial encounter S16.1XXA    Trapezius strain, right, initial encounter - Primary S46.811A       Insurance:  Visit number: 5of 50  Insurance Type: Payor: ANTHEM / Plan: ANTHEM HMP / Product Type: *No Product type* /   Authorization or Plan of Care date Range:$35 COPAY 50 PT/OT/ST V PCY 0 USED NO AUTH NEEDES PAYS %     General:  Reason for visit: Episodic subacute Localized Right sided neck pain and limited neck R Rot,  R SB, and Extension.  Negative radiculopathy cluster.  R shoulder cleared  xray R shoulder is WNL  Referred by: Dr Tashi Novak MD appt:  5/7/24     Precautions:  Skin CA, CAD, HTN, vascualr disease, anemia, OA of hands.  Fall Risk: None    Assessment:    Education: Reviewed home exercise program. Discussed Dry needling.  Progress towards functional goals:  Episodic localized neck pain.  Had a good week, but work up with pain today.   Localized to the R cervical paraspinals and UT.  Mild ROM restriction Flex, R Rot, Sig ROM limit with Ext.  Response to interventions: Patient tolerated therapeutic interventions well and without any adverse events. Improved joint mobility.   Justification for continued skilled care: To address remaining functional, objective and subjective deficits to allow them to return to full independence with ADLs.    Plan:  Exercises targeting surrounding musculature to stabilize the joint and improve function. Dry needling to reduce pain.    Subjective:    Doing well this week, except for this morning woke up with pain.   Thinking about dry needling.   Pain (0-10): 7     "HEP adherence / understanding: compliance with the instructed home exercises.      Objective:  Cervical ROM:   Flexion is not limited but it is painful  Extension is limited and painful  Rotation is slightly limited to R compared to L with minimal pain  SB is symmetrical    She is point tender to palpation at the R cervical paraspinals, levator, and UT.    Treatment Performed: (\"NP\" = Not Performed)     Therapeutic Exercise:     15 minutes  Access Code: [email or web address]  UBE 2/2   Seated chin tuck: increases pain  Seated SB isometrics 5\" x 10   Supine chin nod - feels awkward to patient  **ACTIVITIES BELOW WERE NOT PERFORMED**     Seated ext using towel roll, \"rabbit ears\"    Seated Thoracic Lumbar Extension  - 10 reps - 3-5 second hold  Seated Levator Scapulae Stretch  - 7 x weekly - 3 reps - 30 seconds hold  Seated Upper Trapezius Stretch  -  3 reps - 30 second hold  Seated Assisted Cervical Rotation with Towel  - 1 sets - 5 reps - 5 second hold  Tband Row/LAE/ER/Horiz Abd 2 x 10 ea      Manual Therapy:     25 minutes  Seated STM/DTM to the R and L UT/cervical paraspinals  Suboccipital release  Prone UPA R and L C4 - T2    Neuromuscular Re-education:      minutes      Gait Training:            minutes      Aquatics:            minutes      Therapeutic Activity:      minutes      Gait Training:            minutes      Modalities:       Vasopneumatic Device       minutes  Electrical Stimulation          minutes  Ultrasound            minutes  Iontophoresis                     minutes  Cold Pack            minutes  Mechanical Traction           minutes    Self Care Home Management:    minutes    Canalith Reposition:          minutes     Education:          minutes    Other:       minutes      Evaluation Complexity: Low:   minutes; Moderate   minutes; Complex   minutes    Re-Evaluation:   minutes    .[email or web address]     "

## 2024-06-14 ENCOUNTER — LAB (OUTPATIENT)
Dept: LAB | Facility: LAB | Age: 64
End: 2024-06-14
Payer: COMMERCIAL

## 2024-06-14 DIAGNOSIS — I10 PRIMARY HYPERTENSION: ICD-10-CM

## 2024-06-14 DIAGNOSIS — I77.3 FIBROMUSCULAR DYSPLASIA (CMS-HCC): ICD-10-CM

## 2024-06-14 DIAGNOSIS — Z00.00 HEALTH CARE MAINTENANCE: ICD-10-CM

## 2024-06-14 LAB
ANION GAP SERPL CALC-SCNC: 11 MMOL/L (ref 10–20)
BUN SERPL-MCNC: 18 MG/DL (ref 6–23)
CALCIUM SERPL-MCNC: 9.8 MG/DL (ref 8.6–10.3)
CHLORIDE SERPL-SCNC: 103 MMOL/L (ref 98–107)
CHOLEST SERPL-MCNC: 241 MG/DL (ref 0–199)
CHOLESTEROL/HDL RATIO: 2.4
CO2 SERPL-SCNC: 30 MMOL/L (ref 21–32)
CREAT SERPL-MCNC: 0.75 MG/DL (ref 0.5–1.05)
EGFRCR SERPLBLD CKD-EPI 2021: 88 ML/MIN/1.73M*2
ERYTHROCYTE [DISTWIDTH] IN BLOOD BY AUTOMATED COUNT: 12.6 % (ref 11.5–14.5)
GLUCOSE SERPL-MCNC: 93 MG/DL (ref 74–99)
HCT VFR BLD AUTO: 40.4 % (ref 36–46)
HDLC SERPL-MCNC: 100.6 MG/DL
HGB BLD-MCNC: 13.3 G/DL (ref 12–16)
LDLC SERPL CALC-MCNC: 130 MG/DL
MCH RBC QN AUTO: 31.2 PG (ref 26–34)
MCHC RBC AUTO-ENTMCNC: 32.9 G/DL (ref 32–36)
MCV RBC AUTO: 95 FL (ref 80–100)
NON HDL CHOLESTEROL: 140 MG/DL (ref 0–149)
NRBC BLD-RTO: 0 /100 WBCS (ref 0–0)
PLATELET # BLD AUTO: 195 X10*3/UL (ref 150–450)
POTASSIUM SERPL-SCNC: 3.9 MMOL/L (ref 3.5–5.3)
RBC # BLD AUTO: 4.26 X10*6/UL (ref 4–5.2)
SODIUM SERPL-SCNC: 140 MMOL/L (ref 136–145)
TRIGL SERPL-MCNC: 54 MG/DL (ref 0–149)
VLDL: 11 MG/DL (ref 0–40)
WBC # BLD AUTO: 3.2 X10*3/UL (ref 4.4–11.3)

## 2024-06-14 PROCEDURE — 80048 BASIC METABOLIC PNL TOTAL CA: CPT

## 2024-06-14 PROCEDURE — 85027 COMPLETE CBC AUTOMATED: CPT

## 2024-06-14 PROCEDURE — 80061 LIPID PANEL: CPT

## 2024-06-14 PROCEDURE — 36415 COLL VENOUS BLD VENIPUNCTURE: CPT

## 2024-06-18 ENCOUNTER — OFFICE VISIT (OUTPATIENT)
Dept: CARDIOLOGY | Facility: HOSPITAL | Age: 64
End: 2024-06-18
Payer: COMMERCIAL

## 2024-06-18 ENCOUNTER — APPOINTMENT (OUTPATIENT)
Dept: VASCULAR MEDICINE | Facility: HOSPITAL | Age: 64
End: 2024-06-18
Payer: COMMERCIAL

## 2024-06-18 ENCOUNTER — HOSPITAL ENCOUNTER (OUTPATIENT)
Dept: VASCULAR MEDICINE | Facility: HOSPITAL | Age: 64
Discharge: HOME | End: 2024-06-18
Payer: COMMERCIAL

## 2024-06-18 ENCOUNTER — TREATMENT (OUTPATIENT)
Dept: PHYSICAL THERAPY | Facility: CLINIC | Age: 64
End: 2024-06-18
Payer: COMMERCIAL

## 2024-06-18 ENCOUNTER — APPOINTMENT (OUTPATIENT)
Dept: PRIMARY CARE | Facility: CLINIC | Age: 64
End: 2024-06-18
Payer: COMMERCIAL

## 2024-06-18 ENCOUNTER — HOSPITAL ENCOUNTER (OUTPATIENT)
Dept: RADIOLOGY | Facility: HOSPITAL | Age: 64
Discharge: HOME | End: 2024-06-18
Payer: COMMERCIAL

## 2024-06-18 VITALS
SYSTOLIC BLOOD PRESSURE: 182 MMHG | HEART RATE: 89 BPM | OXYGEN SATURATION: 100 % | BODY MASS INDEX: 23.05 KG/M2 | HEIGHT: 64 IN | WEIGHT: 135 LBS | DIASTOLIC BLOOD PRESSURE: 90 MMHG

## 2024-06-18 DIAGNOSIS — I65.23 OCCLUSION AND STENOSIS OF BILATERAL CAROTID ARTERIES: ICD-10-CM

## 2024-06-18 DIAGNOSIS — I72.8 ANEURYSM, SPLENIC ARTERY (CMS-HCC): ICD-10-CM

## 2024-06-18 DIAGNOSIS — I77.3 ARTERIAL FIBROMUSCULAR DYSPLASIA (CMS-HCC): ICD-10-CM

## 2024-06-18 DIAGNOSIS — S16.1XXA CERVICAL STRAIN, ACUTE, INITIAL ENCOUNTER: ICD-10-CM

## 2024-06-18 DIAGNOSIS — I10 ESSENTIAL HYPERTENSION: Primary | ICD-10-CM

## 2024-06-18 DIAGNOSIS — I65.23 BILATERAL CAROTID ARTERY STENOSIS: ICD-10-CM

## 2024-06-18 DIAGNOSIS — S46.811A TRAPEZIUS STRAIN, RIGHT, INITIAL ENCOUNTER: Primary | ICD-10-CM

## 2024-06-18 DIAGNOSIS — I87.2 VENOUS INSUFFICIENCY (CHRONIC) (PERIPHERAL): ICD-10-CM

## 2024-06-18 DIAGNOSIS — I72.8 ANEURYSM OF OTHER SPECIFIED ARTERIES (CMS-HCC): ICD-10-CM

## 2024-06-18 DIAGNOSIS — I77.3 FIBROMUSCULAR DYSPLASIA (CMS-HCC): ICD-10-CM

## 2024-06-18 DIAGNOSIS — I70.90 ATHEROSCLEROSIS OF ARTERIES: ICD-10-CM

## 2024-06-18 DIAGNOSIS — I83.93 ASYMPTOMATIC VARICOSE VEINS OF BOTH LOWER EXTREMITIES: ICD-10-CM

## 2024-06-18 DIAGNOSIS — R30.0 DYSURIA: Primary | ICD-10-CM

## 2024-06-18 LAB
APPEARANCE UR: CLEAR
BILIRUB UR QL STRIP: NEGATIVE
COLOR UR: YELLOW
GLUCOSE UR STRIP-MCNC: NEGATIVE MG/DL
HGB UR QL STRIP: NEGATIVE
KETONES UR STRIP-MCNC: NEGATIVE MG/DL
LEUKOCYTE ESTERASE UR QL STRIP: NEGATIVE
NITRITE UR QL STRIP: NEGATIVE
PH UR STRIP: 8.5 [PH]
PROT UR STRIP-MCNC: NEGATIVE MG/DL
SP GR UR STRIP.AUTO: 1.01
UROBILINOGEN UR STRIP-ACNC: 0.2 E.U./DL

## 2024-06-18 PROCEDURE — 97140 MANUAL THERAPY 1/> REGIONS: CPT | Mod: GP | Performed by: PHYSICAL THERAPIST

## 2024-06-18 PROCEDURE — 99214 OFFICE O/P EST MOD 30 MIN: CPT | Performed by: INTERNAL MEDICINE

## 2024-06-18 PROCEDURE — 1036F TOBACCO NON-USER: CPT | Performed by: INTERNAL MEDICINE

## 2024-06-18 PROCEDURE — 1159F MED LIST DOCD IN RCRD: CPT | Performed by: INTERNAL MEDICINE

## 2024-06-18 PROCEDURE — 97110 THERAPEUTIC EXERCISES: CPT | Mod: GP | Performed by: PHYSICAL THERAPIST

## 2024-06-18 PROCEDURE — 3080F DIAST BP >= 90 MM HG: CPT | Performed by: INTERNAL MEDICINE

## 2024-06-18 PROCEDURE — 3077F SYST BP >= 140 MM HG: CPT | Performed by: INTERNAL MEDICINE

## 2024-06-18 PROCEDURE — 74174 CTA ABD&PLVS W/CONTRAST: CPT | Performed by: RADIOLOGY

## 2024-06-18 PROCEDURE — 87086 URINE CULTURE/COLONY COUNT: CPT

## 2024-06-18 PROCEDURE — 93880 EXTRACRANIAL BILAT STUDY: CPT

## 2024-06-18 PROCEDURE — 2550000001 HC RX 255 CONTRASTS: Performed by: INTERNAL MEDICINE

## 2024-06-18 PROCEDURE — 74174 CTA ABD&PLVS W/CONTRAST: CPT

## 2024-06-18 PROCEDURE — 93880 EXTRACRANIAL BILAT STUDY: CPT | Performed by: SURGERY

## 2024-06-18 RX ORDER — HYDROCHLOROTHIAZIDE 12.5 MG/1
12.5 TABLET ORAL DAILY
Qty: 90 TABLET | Refills: 3 | Status: SHIPPED | OUTPATIENT
Start: 2024-06-18 | End: 2025-06-18

## 2024-06-18 NOTE — PROGRESS NOTES
06/18/24    Vascular Medicine - FMD/Arterial Dissection Program    History of Present Illness  This 63-year-old woman is seen in follow-up of multivessel, multifocal fibromuscular dysplasia. She has FMD involving both internal carotid arteries and vertebral arteries as well as iliac and renal fibromuscular dysplasia and small splenic aneurysm(s). Her main symptoms over time have been headaches and she has cervical and femoral bruits. She has a number of other health issues. She has some atherosclerotic plaque in her abdominal aorta and carotid arteries.     Since I saw her last a year ago, she has not had interval cardiac or vascular events. No hospitalizations or ER visits or procedures.  She is having urinary sx.  She has no headaches and no pulsatile tinnitus.    She is doing Randi frequently and feels great.    Bps at home generally 130s/70s on average.     Leg swelling less of an issue in past.    I prescribed rosuvastatin last visit; she did not start it.    Patient Active Problem List   Diagnosis    DARREL positive    Aneurysm, splenic artery (CMS-HCC)    Anxiety    Fibromuscular dysplasia (CMS-HCC)    Atherosclerosis of arteries    Benign colon polyp    Bilateral carotid artery stenosis    Bruit (arterial)    Carotid artery disease (CMS-HCC)    Chronic rhinitis    Edema    Essential hypertension    Hair loss    Hypertrophy of inferior nasal turbinate    IBS (irritable bowel syndrome)    Nasal septal deviation    Panic disorder    Postmenopausal atrophic vaginitis    Symptomatic menopausal or female climacteric states    Varicose veins of legs    Venous insufficiency (chronic) (peripheral)    Vitamin D deficiency    Abnormal mammogram    Cervical strain, acute, initial encounter    Trapezius strain, right, initial encounter     Current Outpatient Medications   Medication Sig Dispense Refill    amLODIPine (Norvasc) 5 mg tablet TAKE ONE TABLET BY MOUTH DAILY FOR BLOOD PRESSURE. 90 tablet 0    aspirin 81 mg  "chewable tablet Chew 1 tablet (81 mg) once daily.      azelastine (Astelin) 137 mcg (0.1 %) nasal spray Administer into affected nostril(s) twice a day.      biotin 5 mg capsule Take by mouth.      cetirizine (ZyrTEC) 10 mg tablet Take 1 tablet (10 mg) by mouth once daily.      fluticasone (Flonase) 50 mcg/actuation nasal spray Administer 2 sprays into each nostril once daily.      lisinopril 20 mg tablet TAKE ONE TABLET BY MOUTH DAILY 90 tablet 0    magnesium oxide-Mg AA chelate (Magnesium, oxide/AA chelate,) 300 mg capsule Take by mouth.       No current facility-administered medications for this visit.     Allergies   Allergen Reactions    Meperidine Other    Meperidine (Pf) Other     On examination:  Patient Vitals for the past 24 hrs:   BP Pulse SpO2 Height Weight   06/18/24 1022 (!) 182/90 89 100 % 1.626 m (5' 4\") 61.2 kg (135 lb)   Repeat BP was 128/82 mm Hg left arm  HEENT benign  Bilateral carotid bruits, left louder than right  +S1, S2 RRR; no m/r/g  Breathing non labored  Abd benign, no bruits  Bilateral femoral bruits  Good pedal pulses  Varicose veins bilaterally    Labs reviewed  LDL remains 130, unchanged from last year  Component      Latest Ref Rng 6/14/2024   GLUCOSE      74 - 99 mg/dL 93    SODIUM      136 - 145 mmol/L 140    POTASSIUM      3.5 - 5.3 mmol/L 3.9    CHLORIDE      98 - 107 mmol/L 103    Bicarbonate      21 - 32 mmol/L 30    Anion Gap      10 - 20 mmol/L 11    Blood Urea Nitrogen      6 - 23 mg/dL 18    Creatinine      0.50 - 1.05 mg/dL 0.75    EGFR      >60 mL/min/1.73m*2 88    Calcium      8.6 - 10.3 mg/dL 9.8    LEUKOCYTES (10*3/UL) IN BLOOD BY AUTOMATED COUNT, Turkish      4.4 - 11.3 x10*3/uL 3.2 (L)    nRBC      0.0 - 0.0 /100 WBCs 0.0    ERYTHROCYTES (10*6/UL) IN BLOOD BY AUTOMATED COUNT, Turkish      4.00 - 5.20 x10*6/uL 4.26    HEMOGLOBIN      12.0 - 16.0 g/dL 13.3    HEMATOCRIT      36.0 - 46.0 % 40.4    MCV      80 - 100 fL 95    MCH      26.0 - 34.0 pg 31.2    MCHC      " "32.0 - 36.0 g/dL 32.9    RED CELL DISTRIBUTION WIDTH      11.5 - 14.5 % 12.6    PLATELETS (10*3/UL) IN BLOOD AUTOMATED COUNT, Upper sor[place]      150 - 450 x10*3/uL 195    CHOLESTEROL      0 - 199 mg/dL 241 (H)    HDL CHOLESTEROL      mg/dL 100.6    Cholesterol/HDL Ratio 2.4    LDL Calculated      <=99 mg/dL 130 (H)    VLDL      0 - 40 mg/dL 11    TRIGLYCERIDES      0 - 149 mg/dL 54    Non HDL Cholesterol      0 - 149 mg/dL 140       Today's carotid duplex  Right CCA/ICA atheroma  Evidence of FMD both ICAs, no change in velocities  Left ICA  (was 157 cm/sec), Right  (was 126 cm/sec )  Bilateral vertebral turbulence, tortuosity, likely beading   Right vert  cm/sec, left vert 121 cm/sec    CTA abdomen/pelvis  Aortic atheroma  Multiple areas of splenic ectasia, small aneurysm, largest ~ 11 - 12 mm  Right renal beading also likely left renal beading  Bilateral EIA beading     10.2021 CTA  Bilateral L > right ICA multifocal beading, left ICA \"web\", FMD variant reported  ? vertebral beading  no obvious IC aneurysms        Impressions     63 year-old woman with multivessel, multifocal FMD, small splenic aneurysm(s). No interval vascular events. She has mild atherosclerotic disease. Bps running high.     In terms of FMD, I think her carotid/vert, renal + EIA FMD and splenic aneurysms seem to be stable by imaging. No interval events. Continue low dose aspirin. Repeat imaging 1 year (carotid duplex + renal duplex). Threshold for splenic aneurysm repair would be >2.5- 3.0 cms; would re image splenic artery in a few years.    BP high at home, goal 120s/70s. She is open to adding hydrochlorothiazide 12.5 mg/day atop amlodipine and lisinopril 20 mg/day.  Given largely stable renal findings over ~ 15+ years of our working together, I am less enthusiastic to pursue renal angiography/PTA but this is an option.     Again discussed atherosclerosis in carotid/abdominal arteries. Even if prior coronary Ca++ score zero, I " want to see LDL well < 100 mg/dL. We've been talking about statin Rx for over a decade; I prescribed last visit and she didn't take it.  I fear I don't know what to do at this point and will not pursue this again right now. I fear we've had this same conversation since 2009.    RTC 1 year with carotid + renal duplex; interval electronic communication on BP.    Yazmin Hood MD

## 2024-06-18 NOTE — PATIENT INSTRUCTIONS
Nice to see you today Ms. Escobar.  Start hydrochlorothiazide 12.5mg once daily.  Continue other medications as prescribed.  Monitor BP at home at different times a few days per week.    See you back in one year with carotid and renal ultrasounds.    Yazmin Hood MD  Co-Director, Vascular Center  [facility] [place], [facility]   [facility] Master Clinician in Fibromuscular Dysplasia and Vascular Care  Professor of Medicine  [facility] [facility]

## 2024-06-18 NOTE — PROGRESS NOTES
PHYSICAL THERAPY TREATMENT NOTE    Patient Name:  Siobhan Escobar   Patient MRN: [id number]  Date: 06/18/24  Time Calculation  Start Time: 0115  Stop Time: 0155  Time Calculation (min): 40 min      Problem List Items Addressed This Visit             ICD-10-CM    Cervical strain, acute, initial encounter S16.1XXA    Trapezius strain, right, initial encounter - Primary S46.811A         Insurance:  Visit number: 6of 50  Insurance Type: Payor: ANTHEM / Plan: ANTHEM HMP / Product Type: *No Product type* /   Authorization or Plan of Care date Range:$35 COPAY 50 PT/OT/ST V PCY 0 USED NO AUTH NEEDES PAYS %     General:  Reason for visit: Episodic subacute Localized Right sided neck pain and limited neck R Rot,  R SB, and Extension.  Negative radiculopathy cluster.  R shoulder cleared  xray R shoulder is WNL  Referred by: Dr Tashi Novak MD appt:  5/7/24     Precautions:  Skin CA, CAD, HTN, vascualr disease, anemia, OA of hands. Fibromuscular dysplasia - Abnormal cell growth in arteries - seeing MD about this.  Fall Risk: None    Assessment:    Education:  discussed dry needling with patient and answered questions.  Progress towards functional goals: Reduced frequency of pain. Reduced intensity of pain. Reduced pain level.  Increased today, but somewhat stressful day with MD follow up appts.  Response to interventions: Patient tolerated therapeutic interventions well and without any adverse events.  Justification for continued skilled care: To address remaining functional, objective and subjective deficits to allow them to return to full independence with ADLs.    Plan:  How did she do with with DN.    Subjective:   Siobhan reports she feels like her condition is improving.   She felt good for 4 days after last PT session.  Today she reports  6/10 R  Recent US on carotids and a contrast CT on abdomen  "- she reports her results were good.   Pain (0-10): 6    HEP adherence / understanding: compliance with the instructed home exercises.    Objective:  Cervical ROM:   Flexion is not limited but it is painful  Extension is limited and painful  Rotation is slightly limited to R compared to L with minimal pain  SB is symmetrical    She is point tender to palpation at the R cervical paraspinals, levator, and UT.    Treatment Performed: (\"NP\" = Not Performed)     Therapeutic Exercise:     5 minutes  Access Code: [email or web address]  UBE 2/2     **ACTIVITIES BELOW WERE NOT PERFORMED**   Seated chin tuck: increases pain  Seated SB isometrics 5\" x 10   Supine chin nod - feels awkward to patient    Seated ext using towel roll, \"rabbit ears\"    Seated Thoracic Lumbar Extension  - 10 reps - 3-5 second hold  Seated Levator Scapulae Stretch  - 7 x weekly - 3 reps - 30 seconds hold  Seated Upper Trapezius Stretch  -  3 reps - 30 second hold  Seated Assisted Cervical Rotation with Towel  - 1 sets - 5 reps - 5 second hold  Tband Row/LAE/ER/Horiz Abd 2 x 10 ea      Manual Therapy:     35 minutes  Seated STM/DTM to the R and L UT/cervical paraspinals  Suboccipital release  Prone UPA R and L C4 - T2 - NP  Dry needling: bilateral segments C6/7, R UT, R levator     Neuromuscular Re-education:      minutes      Gait Training:            minutes      Aquatics:            minutes      Therapeutic Activity:      minutes      Gait Training:            minutes      Modalities:       Vasopneumatic Device       minutes  Electrical Stimulation          minutes  Ultrasound            minutes  Iontophoresis                     minutes  Cold Pack            minutes  Mechanical Traction           minutes    Self Care Home Management:    minutes    Canalith Reposition:          minutes     Education:          minutes    Other:       minutes      Evaluation Complexity: Low:   minutes; Moderate   minutes; Complex   minutes    Re-Evaluation:   " minutes    .[email or web address]

## 2024-06-18 NOTE — PROGRESS NOTES
Pt here with complaints of burning with urination and pressure type feeling in her bladder.   Was seen in urgent care last week and was told she did not have a UTI but her symptoms have persisted. Will re check urine

## 2024-06-19 LAB — BACTERIA UR CULT: NORMAL

## 2024-06-25 ENCOUNTER — APPOINTMENT (OUTPATIENT)
Dept: PHYSICAL THERAPY | Facility: CLINIC | Age: 64
End: 2024-06-25
Payer: COMMERCIAL

## 2024-06-28 ENCOUNTER — APPOINTMENT (OUTPATIENT)
Dept: OBSTETRICS AND GYNECOLOGY | Facility: CLINIC | Age: 64
End: 2024-06-28
Payer: COMMERCIAL

## 2024-06-28 VITALS
DIASTOLIC BLOOD PRESSURE: 78 MMHG | HEIGHT: 64 IN | SYSTOLIC BLOOD PRESSURE: 173 MMHG | WEIGHT: 133.7 LBS | BODY MASS INDEX: 22.83 KG/M2

## 2024-06-28 DIAGNOSIS — N95.2 ATROPHIC VAGINITIS: Primary | ICD-10-CM

## 2024-06-28 PROCEDURE — 1036F TOBACCO NON-USER: CPT | Performed by: OBSTETRICS & GYNECOLOGY

## 2024-06-28 PROCEDURE — 3077F SYST BP >= 140 MM HG: CPT | Performed by: OBSTETRICS & GYNECOLOGY

## 2024-06-28 PROCEDURE — 99213 OFFICE O/P EST LOW 20 MIN: CPT | Performed by: OBSTETRICS & GYNECOLOGY

## 2024-06-28 PROCEDURE — 3078F DIAST BP <80 MM HG: CPT | Performed by: OBSTETRICS & GYNECOLOGY

## 2024-06-28 PROCEDURE — 1160F RVW MEDS BY RX/DR IN RCRD: CPT | Performed by: OBSTETRICS & GYNECOLOGY

## 2024-06-28 PROCEDURE — 1159F MED LIST DOCD IN RCRD: CPT | Performed by: OBSTETRICS & GYNECOLOGY

## 2024-06-28 RX ORDER — ESTRADIOL 0.1 MG/G
CREAM VAGINAL
Qty: 34 G | Refills: 3 | Status: SHIPPED | OUTPATIENT
Start: 2024-06-28

## 2024-06-28 NOTE — PROGRESS NOTES
Subjective   Patient ID: Siobhan Escobar is a 63 y.o. female who presents for Vaginal Burning (Patient here for c/o external vaginal burning when urinating or moving bowels--has had numerous neg UA tests/Pt also has c/o thinning hair-wonders if its hormonal/Pt has hx vascular dx-cannot take hrt/Declined chaperone).  HPI  Patient is a 63-year-old  1 para 1 white female whose had 1 spontaneous vaginal delivery.  Patient has been menopausal since her late 50s does not use hormone therapy denies any vaginal bleeding.  She admits to regular bowel movements is up-to-date on her colonoscopy does complain of some burning with urination when urine passes over lower part of vagina and vulva and perineum.  This has been going on for about 2 to 3 weeks.  Medical history significant for hypertension she denies cigarette smoking currently works as a .  Family history significant for sisters who have had hysterectomy she thinks they may have been related to cancers but is uncertain.  Patient states her Pap smears have been normal denies history of ovarian cyst, fibroids, endometriosis or pelvic infections.  Review of Systems    Objective   Physical Exam  Pelvic: External genitalia atrophic, vagina atrophic cervix is clean uterus is small freely mobile nontender no palpable adnexal masses or tenderness.  Assessment/Plan   Suspect atrophic vaginitis, recommend vaginal estrogen cream 3 times per week plus pea size applied to vulva and perineum at the same time.  If no improvement after 3 months she will follow-up in the office otherwise yearly visits.  Patient will discuss hormone replacement cream with autoimmune specialist.         Sam Ferrer MD 24 10:15 AM

## 2024-07-09 ENCOUNTER — APPOINTMENT (OUTPATIENT)
Dept: PHYSICAL THERAPY | Facility: CLINIC | Age: 64
End: 2024-07-09
Payer: COMMERCIAL

## 2024-07-10 ENCOUNTER — APPOINTMENT (OUTPATIENT)
Dept: PRIMARY CARE | Facility: CLINIC | Age: 64
End: 2024-07-10
Payer: COMMERCIAL

## 2024-07-10 VITALS
HEIGHT: 64 IN | WEIGHT: 134 LBS | DIASTOLIC BLOOD PRESSURE: 80 MMHG | OXYGEN SATURATION: 98 % | BODY MASS INDEX: 22.88 KG/M2 | HEART RATE: 70 BPM | SYSTOLIC BLOOD PRESSURE: 138 MMHG

## 2024-07-10 DIAGNOSIS — I77.3 ARTERIAL FIBROMUSCULAR DYSPLASIA (CMS-HCC): ICD-10-CM

## 2024-07-10 DIAGNOSIS — R30.0 DYSURIA: ICD-10-CM

## 2024-07-10 DIAGNOSIS — N95.8 GENITOURINARY SYNDROME OF MENOPAUSE: Primary | ICD-10-CM

## 2024-07-10 DIAGNOSIS — F41.8 ANXIETY ABOUT HEALTH: ICD-10-CM

## 2024-07-10 DIAGNOSIS — L65.9 HAIR LOSS: ICD-10-CM

## 2024-07-10 DIAGNOSIS — I10 ESSENTIAL HYPERTENSION: ICD-10-CM

## 2024-07-10 DIAGNOSIS — L60.3 BRITTLE NAILS: ICD-10-CM

## 2024-07-10 PROCEDURE — 1160F RVW MEDS BY RX/DR IN RCRD: CPT | Performed by: STUDENT IN AN ORGANIZED HEALTH CARE EDUCATION/TRAINING PROGRAM

## 2024-07-10 PROCEDURE — 3079F DIAST BP 80-89 MM HG: CPT | Performed by: STUDENT IN AN ORGANIZED HEALTH CARE EDUCATION/TRAINING PROGRAM

## 2024-07-10 PROCEDURE — 3075F SYST BP GE 130 - 139MM HG: CPT | Performed by: STUDENT IN AN ORGANIZED HEALTH CARE EDUCATION/TRAINING PROGRAM

## 2024-07-10 PROCEDURE — 1159F MED LIST DOCD IN RCRD: CPT | Performed by: STUDENT IN AN ORGANIZED HEALTH CARE EDUCATION/TRAINING PROGRAM

## 2024-07-10 PROCEDURE — 99214 OFFICE O/P EST MOD 30 MIN: CPT | Performed by: STUDENT IN AN ORGANIZED HEALTH CARE EDUCATION/TRAINING PROGRAM

## 2024-07-10 PROCEDURE — 1036F TOBACCO NON-USER: CPT | Performed by: STUDENT IN AN ORGANIZED HEALTH CARE EDUCATION/TRAINING PROGRAM

## 2024-07-10 ASSESSMENT — ENCOUNTER SYMPTOMS
DYSPHORIC MOOD: 0
ACTIVITY CHANGE: 0
GASTROINTESTINAL NEGATIVE: 1
MUSCULOSKELETAL NEGATIVE: 1
SHORTNESS OF BREATH: 0
DIZZINESS: 0
HEADACHES: 0
FATIGUE: 0
CHEST TIGHTNESS: 0
SLEEP DISTURBANCE: 0

## 2024-07-10 ASSESSMENT — PATIENT HEALTH QUESTIONNAIRE - PHQ9
2. FEELING DOWN, DEPRESSED OR HOPELESS: NOT AT ALL
SUM OF ALL RESPONSES TO PHQ9 QUESTIONS 1 AND 2: 0
1. LITTLE INTEREST OR PLEASURE IN DOING THINGS: NOT AT ALL

## 2024-07-10 NOTE — PROGRESS NOTES
Subjective   Patient ID: Siobhan Escobar is a 63 y.o. female who presents for Follow-up (Follow up - Sees Dr. Duke and had some testing done- Wanted to discuss results and how to treat /Has questions and concerns about vaginal estrogen cream/Concerns about possible side effects from medications ).  Follow up.    Multiple concerns about medications and possible side effects.     Checks her BP multiple times per day. Was prescribed hydrochlorothiazide, was concerned about taking this due to possible side effects.     Does not wish to take statins. Her  had side effects.     Brittle nails. Taking hair skin nails gummies. Not noticing any changes. Wants to know what else she could try. Last TSH was over one year ago.     Right arm skin lesion, following with derm for this.     No other concerns today.         Review of Systems   Constitutional:  Negative for activity change and fatigue.   HENT: Negative.     Respiratory:  Negative for chest tightness and shortness of breath.    Cardiovascular:  Negative for chest pain.   Gastrointestinal: Negative.    Musculoskeletal: Negative.    Neurological:  Negative for dizziness and headaches.   Psychiatric/Behavioral:  Negative for dysphoric mood and sleep disturbance.    All other systems reviewed and are negative.      Objective   Physical Exam  Vitals reviewed.   Constitutional:       General: She is not in acute distress.     Appearance: Normal appearance.   HENT:      Head: Normocephalic.   Eyes:      Pupils: Pupils are equal, round, and reactive to light.   Cardiovascular:      Rate and Rhythm: Normal rate and regular rhythm.   Pulmonary:      Effort: Pulmonary effort is normal. No respiratory distress.   Musculoskeletal:         General: Normal range of motion.   Skin:     General: Skin is warm and dry.   Neurological:      Mental Status: She is alert. Mental status is at baseline.   Psychiatric:         Mood and Affect: Mood normal.         Behavior: Behavior  normal.         Body mass index is 23 kg/m².      Current Outpatient Medications:     amLODIPine (Norvasc) 5 mg tablet, TAKE ONE TABLET BY MOUTH DAILY FOR BLOOD PRESSURE., Disp: 90 tablet, Rfl: 0    aspirin 81 mg chewable tablet, Chew 1 tablet (81 mg) once daily., Disp: , Rfl:     azelastine (Astelin) 137 mcg (0.1 %) nasal spray, Administer into affected nostril(s) twice a day., Disp: , Rfl:     cetirizine (ZyrTEC) 10 mg tablet, Take 1 tablet (10 mg) by mouth once daily., Disp: , Rfl:     estradiol (Estrace) 0.01 % (0.1 mg/gram) vaginal cream, One quarter applicator inserted into vagina 3 times per week followed by a pea-sized amount applied topically to perineum., Disp: 34 g, Rfl: 3    fluticasone (Flonase) 50 mcg/actuation nasal spray, Administer 2 sprays into each nostril once daily., Disp: , Rfl:     hydroCHLOROthiazide (Microzide) 12.5 mg tablet, Take 1 tablet (12.5 mg) by mouth once daily., Disp: 90 tablet, Rfl: 3    lisinopril 20 mg tablet, TAKE ONE TABLET BY MOUTH DAILY, Disp: 90 tablet, Rfl: 0    magnesium oxide-Mg AA chelate (Magnesium, oxide/AA chelate,) 300 mg capsule, Take by mouth., Disp: , Rfl:       Assessment/Plan   Diagnoses and all orders for this visit:  Genitourinary syndrome of menopause  Comments:  recommended trying external estradiol cream  Anxiety about health  Arterial fibromuscular dysplasia (CMS-HCC)  Comments:  continue following with cardiology  Essential hypertension  Comments:  recommended trying HCTZ and assessing for side effects  Dysuria  Hair loss  Comments:  can try vitamins and collagen  reports she gets enough collagen  previously not interested in minoxodil  check TSH  Orders:  -     Iron and TIBC; Future  -     Tsh With Reflex To Free T4 If Abnormal; Future  Brittle nails  -     Iron and TIBC; Future  -     Tsh With Reflex To Free T4 If Abnormal; Future    Patient encounter 30 minutes    Follow up in 6 months       Zora Henriquez DO 07/10/24 10:41 PM

## 2024-07-13 ENCOUNTER — APPOINTMENT (OUTPATIENT)
Dept: DERMATOLOGY | Facility: CLINIC | Age: 64
End: 2024-07-13
Payer: COMMERCIAL

## 2024-07-13 DIAGNOSIS — L57.0 ACTINIC KERATOSIS: Primary | ICD-10-CM

## 2024-07-13 NOTE — PROGRESS NOTES
Subjective   Siobhan Escobar is a 63 y.o. female who presents for the following: Actinic Keratosis.  Actinic Keratosis  She complains of new skin lesions. She describes erythematous, scaly lesions located on the  right arm . Symptoms include none. Previous treatment for prior lesions has been none. Past history of skin cancer: basal cell carcinoma, squamous cell carcinoma, none. Other skin problems: no.        Objective   Well appearing patient in no apparent distress; mood and affect are within normal limits.    A focused examination was performed including upper extremities, including the arms, hands, fingers, and fingernails. All findings within normal limits unless otherwise noted below.    Right Forearm - Posterior  Erythematous scaly macule      Assessment/Plan   Actinic keratosis  Right Forearm - Posterior    -Discussed nature of diagnosis and treatment options.   -Patient wishes to proceed with Cryotherapy today  -Possible side effects of liquid nitrogen treatment reviewed including formation of blisters, crusting, tenderness, scar, and discoloration which may be permanent.  -Patient advised to return the office for re-evaluation if the treated lesion(s) do not resolve within 4-6 weeks. Patient verbalizes understanding.    Destr of lesion - Right Forearm - Posterior  Complexity: simple    Destruction method: cryotherapy    Informed consent: discussed and consent obtained    Lesion destroyed using liquid nitrogen: Yes    Cryotherapy cycles:  1  Outcome: patient tolerated procedure well with no complications    Post-procedure details: wound care instructions given      Follow up for a total body skin exam. Please call me if there are any changes or development of concerning symptoms (lesion/skin condition is changing, bleeding, enlarging, or worsening).

## 2024-07-14 NOTE — PROGRESS NOTES
Subjective   Patient ID: Siobhan Escobar is a 63 y.o. female who presents for No chief complaint on file..  HPI  PT PRESENTS FOR EVALUATION OF INTERMITTENT S-P PRESSURE AND DISCOMFORT. SX'S PRESENT ON AND OFF FOR 8 WEEKS    DO YOU EXPERIENCE:  Burning on urination -- YES  Pain on urination  -- NO  Urinary frequency  __ YES  Every 1-2 hours __, every 2-3 hours X __ every 3-4 hours __  Urinary urgency -- ASSOCIATED WITH COFFEE  Urge incontinence -- NO  Urinary stress incontinence  __  NO  Pads changed per day -- 1-2 __, 2-3 __, 3-4__ 5 +__  Nocturia-- ZERO  TO ONE   1-2 times __, 2 - 3 times __, 3-4 times __, greater than 5 times__  Hematuria -- NO  Hesitancy -- NO  Post void fullness -- NO    Review of Systems  General-- No C/O fever or chills  Head-- No C/O Dizziness  Eyes-- NO  C/O blurry or double vision  Ears-- No C/O hearing loss  Neck-- Supple  Chest-- No C/O pain or discomfort  Lungs-- No C/O shortness of breath  Abdomen-- No C/O  pain or discomfort, No nausea or vomiting  Back-- No C/O back pain or discomfort  Extremities-- No C/O swelling or pain    COFFEE -- 2 -3 CUPS / DAY    Objective   Physical Exam  General-- well developed, well nourished in NAD  Head-- normal cephalic, atraumatic  Eyes-- PERRL, EOM'S FROM,  no  jaundice  Neck-- Supple, without masses  Chest-- Normal bony structure  Abdomen-- soft, non tender, liver spleen not tender. 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

## 2024-07-15 ENCOUNTER — OFFICE VISIT (OUTPATIENT)
Dept: UROLOGY | Facility: CLINIC | Age: 64
End: 2024-07-15
Payer: COMMERCIAL

## 2024-07-15 ENCOUNTER — LAB (OUTPATIENT)
Dept: LAB | Facility: LAB | Age: 64
End: 2024-07-15
Payer: COMMERCIAL

## 2024-07-15 ENCOUNTER — TREATMENT (OUTPATIENT)
Dept: PHYSICAL THERAPY | Facility: CLINIC | Age: 64
End: 2024-07-15
Payer: COMMERCIAL

## 2024-07-15 VITALS
BODY MASS INDEX: 22.88 KG/M2 | HEIGHT: 64 IN | HEART RATE: 85 BPM | TEMPERATURE: 98 F | DIASTOLIC BLOOD PRESSURE: 77 MMHG | SYSTOLIC BLOOD PRESSURE: 148 MMHG | WEIGHT: 134 LBS | RESPIRATION RATE: 16 BRPM

## 2024-07-15 DIAGNOSIS — S16.1XXA CERVICAL STRAIN, ACUTE, INITIAL ENCOUNTER: ICD-10-CM

## 2024-07-15 DIAGNOSIS — S46.811A TRAPEZIUS STRAIN, RIGHT, INITIAL ENCOUNTER: Primary | ICD-10-CM

## 2024-07-15 DIAGNOSIS — Z13.89 SCREENING FOR BLOOD OR PROTEIN IN URINE: ICD-10-CM

## 2024-07-15 DIAGNOSIS — L60.3 BRITTLE NAILS: ICD-10-CM

## 2024-07-15 DIAGNOSIS — L65.9 HAIR LOSS: ICD-10-CM

## 2024-07-15 DIAGNOSIS — N39.0 CHRONIC UTI: Primary | ICD-10-CM

## 2024-07-15 LAB
IRON SATN MFR SERPL: 17 % (ref 25–45)
IRON SERPL-MCNC: 68 UG/DL (ref 35–150)
POC APPEARANCE, URINE: CLEAR
POC BILIRUBIN, URINE: NEGATIVE
POC BLOOD, URINE: NEGATIVE
POC COLOR, URINE: YELLOW
POC GLUCOSE, URINE: NEGATIVE MG/DL
POC KETONES, URINE: NEGATIVE MG/DL
POC LEUKOCYTES, URINE: NEGATIVE
POC NITRITE,URINE: NEGATIVE
POC PH, URINE: 7 PH
POC PROTEIN, URINE: NEGATIVE MG/DL
POC SPECIFIC GRAVITY, URINE: 1.02
POC UROBILINOGEN, URINE: 0.2 EU/DL
TIBC SERPL-MCNC: 397 UG/DL (ref 240–445)
TSH SERPL-ACNC: 1.06 MIU/L (ref 0.44–3.98)
UIBC SERPL-MCNC: 329 UG/DL (ref 110–370)

## 2024-07-15 PROCEDURE — 3077F SYST BP >= 140 MM HG: CPT | Performed by: UROLOGY

## 2024-07-15 PROCEDURE — 84443 ASSAY THYROID STIM HORMONE: CPT

## 2024-07-15 PROCEDURE — 83540 ASSAY OF IRON: CPT

## 2024-07-15 PROCEDURE — 3008F BODY MASS INDEX DOCD: CPT | Performed by: UROLOGY

## 2024-07-15 PROCEDURE — 3078F DIAST BP <80 MM HG: CPT | Performed by: UROLOGY

## 2024-07-15 PROCEDURE — 97140 MANUAL THERAPY 1/> REGIONS: CPT | Mod: GP | Performed by: PHYSICAL THERAPIST

## 2024-07-15 PROCEDURE — 36415 COLL VENOUS BLD VENIPUNCTURE: CPT

## 2024-07-15 PROCEDURE — 99213 OFFICE O/P EST LOW 20 MIN: CPT | Performed by: UROLOGY

## 2024-07-15 PROCEDURE — 1159F MED LIST DOCD IN RCRD: CPT | Performed by: UROLOGY

## 2024-07-15 PROCEDURE — 1126F AMNT PAIN NOTED NONE PRSNT: CPT | Performed by: UROLOGY

## 2024-07-15 PROCEDURE — 83550 IRON BINDING TEST: CPT

## 2024-07-15 PROCEDURE — 81003 URINALYSIS AUTO W/O SCOPE: CPT | Mod: QW | Performed by: UROLOGY

## 2024-07-15 SDOH — ECONOMIC STABILITY: FOOD INSECURITY: WITHIN THE PAST 12 MONTHS, THE FOOD YOU BOUGHT JUST DIDN'T LAST AND YOU DIDN'T HAVE MONEY TO GET MORE.: NEVER TRUE

## 2024-07-15 SDOH — ECONOMIC STABILITY: FOOD INSECURITY: WITHIN THE PAST 12 MONTHS, YOU WORRIED THAT YOUR FOOD WOULD RUN OUT BEFORE YOU GOT MONEY TO BUY MORE.: NEVER TRUE

## 2024-07-15 ASSESSMENT — LIFESTYLE VARIABLES
AUDIT-C TOTAL SCORE: 1
HOW OFTEN DO YOU HAVE SIX OR MORE DRINKS ON ONE OCCASION: NEVER
SKIP TO QUESTIONS 9-10: 1
HOW MANY STANDARD DRINKS CONTAINING ALCOHOL DO YOU HAVE ON A TYPICAL DAY: 1 OR 2
HOW OFTEN DO YOU HAVE A DRINK CONTAINING ALCOHOL: MONTHLY OR LESS
HOW MANY STANDARD DRINKS CONTAINING ALCOHOL DO YOU HAVE ON A TYPICAL DAY: 1 OR 2
HOW OFTEN DO YOU HAVE A DRINK CONTAINING ALCOHOL: MONTHLY OR LESS
SKIP TO QUESTIONS 9-10: 1
AUDIT-C TOTAL SCORE: 1
HOW OFTEN DO YOU HAVE SIX OR MORE DRINKS ON ONE OCCASION: NEVER

## 2024-07-15 ASSESSMENT — COLUMBIA-SUICIDE SEVERITY RATING SCALE - C-SSRS
2. HAVE YOU ACTUALLY HAD ANY THOUGHTS OF KILLING YOURSELF?: NO
1. IN THE PAST MONTH, HAVE YOU WISHED YOU WERE DEAD OR WISHED YOU COULD GO TO SLEEP AND NOT WAKE UP?: NO
6. HAVE YOU EVER DONE ANYTHING, STARTED TO DO ANYTHING, OR PREPARED TO DO ANYTHING TO END YOUR LIFE?: NO

## 2024-07-15 ASSESSMENT — PAIN SCALES - GENERAL: PAINLEVEL: 0-NO PAIN

## 2024-07-15 ASSESSMENT — ENCOUNTER SYMPTOMS
OCCASIONAL FEELINGS OF UNSTEADINESS: 0
LOSS OF SENSATION IN FEET: 0

## 2024-07-15 NOTE — PROGRESS NOTES
PHYSICAL THERAPY TREATMENT NOTE    Patient Name:  Siobhan Escobar   Patient MRN: [id number]  Date: 07/15/24         Problem List Items Addressed This Visit             ICD-10-CM    Cervical strain, acute, initial encounter S16.1XXA    Trapezius strain, right, initial encounter - Primary S46.811A           Insurance:  Visit number: Visit count could not be calculated. Make sure you are using a visit which is associated with an episode.of 50  Insurance Type: Payor: ANTHEM / Plan: ANTHEM HMP / Product Type: *No Product type* /   Authorization or Plan of Care date Range:$35 COPAY 50 PT/OT/ST V PCY 0 USED NO AUTH NEEDES PAYS %     General:  Reason for visit: Episodic subacute Localized Right sided neck pain and limited neck R Rot,  R SB, and Extension.  Negative radiculopathy cluster.  R shoulder cleared  xray R shoulder is WNL  Referred by: Dr Tashi Novak MD appt:  5/7/24     Precautions:  Skin CA, CAD, HTN, vascular disease, anemia, OA of hands. Fibromuscular dysplasia - Abnormal cell growth in arteries - seeing MD about this.  Fall Risk: None    Assessment:    Education: Reviewed home exercise program.  Would like her to continue with band exercises for strengthening and stabilization.  Discussed returning to [facility].  Progress towards functional goals: Reduced frequency of pain. Reduced intensity of pain. Reduced pain level.  Response to interventions: Patient tolerated therapeutic interventions well and without any adverse events.  Justification for continued skilled care: To address remaining functional, objective and subjective deficits to allow them to return to full independence with ADLs.      Plan:  Reassess NV.  Considering PRN PT or schedule 1 x in 2-3 weeks for follow up?  Will discuss with patient NV    Subjective:   Siobhan reports she feels like her condition is improving.    "Needling did not significantly improve pain.   Pain (0-10): 4   HEP adherence / understanding: compliance with the instructed home exercises.    Objective:  Cervical ROM:   Flexion is not limited but it is painful  Extension is limited and painful  Rotation is slightly limited to R compared to L with minimal pain  SB is symmetrical    She is point tender to palpation at the R cervical paraspinals, levator, and UT.    Treatment Performed: (\"NP\" = Not Performed)     Therapeutic Exercise:       minutes  Access Code: [email or web address]  UBE 2/2     **ACTIVITIES BELOW WERE NOT PERFORMED**   Seated chin tuck: increases pain  Seated SB isometrics 5\" x 10   Supine chin nod - feels awkward to patient    Seated ext using towel roll, \"rabbit ears\"    Seated Thoracic Lumbar Extension  - 10 reps - 3-5 second hold  Seated Levator Scapulae Stretch  - 7 x weekly - 3 reps - 30 seconds hold  Seated Upper Trapezius Stretch  -  3 reps - 30 second hold  Seated Assisted Cervical Rotation with Towel  - 1 sets - 5 reps - 5 second hold  Tband Row/LAE/ER/Horiz Abd 2 x 10 ea      Manual Therapy:       minutes  Seated STM/DTM to the R and L UT/cervical paraspinals  Suboccipital release  Prone UPA R and L C4 - T2 - NP  Dry needling: bilateral segments C6/7, R UT, R levator     Neuromuscular Re-education:      minutes      Gait Training:            minutes      Aquatics:            minutes      Therapeutic Activity:      minutes      Gait Training:            minutes      Modalities:       Vasopneumatic Device       minutes  Electrical Stimulation          minutes  Ultrasound            minutes  Iontophoresis                     minutes  Cold Pack            minutes  Mechanical Traction           minutes    Self Care Home Management:    minutes    Canalith Reposition:          minutes     Education:          minutes    Other:       minutes      Evaluation Complexity: Low:   minutes; Moderate   minutes; Complex   minutes    Re-Evaluation:   " minutes    .[email or web address]

## 2024-07-26 ENCOUNTER — APPOINTMENT (OUTPATIENT)
Dept: PHYSICAL THERAPY | Facility: CLINIC | Age: 64
End: 2024-07-26
Payer: COMMERCIAL

## 2024-07-31 ENCOUNTER — APPOINTMENT (OUTPATIENT)
Dept: DERMATOLOGY | Facility: CLINIC | Age: 64
End: 2024-07-31
Payer: COMMERCIAL

## 2024-07-31 DIAGNOSIS — L63.9 ALOPECIA AREATA: ICD-10-CM

## 2024-07-31 DIAGNOSIS — L21.9 SEBORRHEIC DERMATITIS: ICD-10-CM

## 2024-07-31 DIAGNOSIS — L60.9 NAIL DISORDER: Primary | ICD-10-CM

## 2024-07-31 DIAGNOSIS — L64.9 ANDROGENIC ALOPECIA: ICD-10-CM

## 2024-07-31 PROCEDURE — 1159F MED LIST DOCD IN RCRD: CPT | Performed by: DERMATOLOGY

## 2024-07-31 PROCEDURE — 1036F TOBACCO NON-USER: CPT | Performed by: DERMATOLOGY

## 2024-07-31 PROCEDURE — 99213 OFFICE O/P EST LOW 20 MIN: CPT | Performed by: DERMATOLOGY

## 2024-07-31 ASSESSMENT — DERMATOLOGY QUALITY OF LIFE (QOL) ASSESSMENT
WHAT SINGLE SKIN CONDITION LISTED BELOW IS THE PATIENT ANSWERING THE QUALITY-OF-LIFE ASSESSMENT QUESTIONS ABOUT: ALOPECIA
ARE THERE EXCLUSIONS OR EXCEPTIONS FOR THE QUALITY OF LIFE ASSESSMENT: NO
RATE HOW EMOTIONALLY BOTHERED YOU ARE BY YOUR SKIN PROBLEM (FOR EXAMPLE, WORRY, EMBARRASSMENT, FRUSTRATION): 0 - NEVER BOTHERED
RATE HOW BOTHERED YOU ARE BY EFFECTS OF YOUR SKIN PROBLEMS ON YOUR ACTIVITIES (EG, GOING OUT, ACCOMPLISHING WHAT YOU WANT, WORK ACTIVITIES OR YOUR RELATIONSHIPS WITH OTHERS): 0 - NEVER BOTHERED
RATE HOW BOTHERED YOU ARE BY SYMPTOMS OF YOUR SKIN PROBLEM (EG, ITCHING, STINGING BURNING, HURTING OR SKIN IRRITATION): 0 - NEVER BOTHERED
DATE THE QUALITY-OF-LIFE ASSESSMENT WAS COMPLETED: 67052

## 2024-07-31 ASSESSMENT — DERMATOLOGY PATIENT ASSESSMENT
HAVE YOU HAD OR DO YOU HAVE A STAPH INFECTION: NO
HAVE YOU HAD OR DO YOU HAVE VASCULAR DISEASE: YES
ARE YOU TRYING TO GET PREGNANT: NO
WHERE ARE THESE NEW OR CHANGING LESIONS LOCATED: SCALP
ARE YOU AN ORGAN TRANSPLANT RECIPIENT: NO
DO YOU HAVE IRREGULAR MENSTRUAL CYCLES: NO
DO YOU HAVE ANY NEW OR CHANGING LESIONS: YES
DO YOU USE A TANNING BED: NO
ARE YOU ON BIRTH CONTROL: NO
DO YOU USE SUNSCREEN: OCCASIONALLY

## 2024-07-31 ASSESSMENT — PATIENT GLOBAL ASSESSMENT (PGA): PATIENT GLOBAL ASSESSMENT: PATIENT GLOBAL ASSESSMENT:  1 - CLEAR

## 2024-07-31 ASSESSMENT — ITCH NUMERIC RATING SCALE: HOW SEVERE IS YOUR ITCHING?: 0

## 2024-07-31 NOTE — PROGRESS NOTES
Subjective     Siobhan Escobar is a 63 y.o. female who presents for the following: Alopecia. Patient reports that hair loss has been present for 5 years. She has noticed a patch of hair loss on her right posterior scalp and thinning of hair along her part line. She denies any increased shedding/hair loss, itching, or burning of the scalp. She is currently taking sugarbear hair vitamins and Ultra nourish hair. Patient had recent labwork: TSH, iron, and BMP wnl. CBC showed leukopenia. % iron saturation low.     Review of Systems:  No other skin or systemic complaints other than what is documented elsewhere in the note.    The following portions of the chart were reviewed this encounter and updated as appropriate:          Skin Cancer History  -History of NMSC: BCC and SCC    Specialty Problems          Dermatology Problems    Hair loss        Objective   Well appearing patient in no apparent distress; mood and affect are within normal limits.    A focused skin examination was performed. All findings within normal limits unless otherwise noted below.    Assessment/Plan   1. Nail disorder (2)  Left Hand - Posterior, Right Hand - Posterior  V-nicking on bilateral 5th digits. Longitudinal ridges on all nails    -Will check vitamin D  -Discussed that thinning of the nails can also occur with age.   -Recommend nail lacquers and biotin to help strengthen nails. Informed patient to stop biotin prior to any labs as it can interfere with the results.     2. Seborrheic dermatitis  Scalp  Clear on exam today    May consider adding dandruff shampoo in future to help with inflammation    3. Androgenic alopecia  Scalp  Patch of alopecia on the right occipital hairline with some regrowth noted. Diffuse thinning. No erythema or scale.                       Multifactorial due to combination of alopecia areata and androgenetic alopecia    -Patch of alopecia areata on the right occipital scalp with some regrowth.   -Discussed the etiology  "of both diagnoses.     -Will check ferritin and vitamin D   - she had recent TSH checked    -Reviewed treatment options including topical and oral minoxidil.   - she is hesitant regarding systemic side effects of oral    -START topical minoxidil 5% once daily.  -Photos taken today for comparison at future visits.   -RTC in 3 months.           Related Procedures  Vitamin D 25-Hydroxy,Total (for eval of Vitamin D levels)  Ferritin  Follow Up In Dermatology - Established Patient    4. Alopecia areata  Right Occipital Scalp  Smooth, circular areas of non-scarring hair loss. One focal patch on right occipital scalp with partial regrowth    -Discussed the nature of the condition  -Discussed treatment options    - see \"androgenic alopecia\" for plan      RTC in 6 months for hair loss/thinning nails    Anastasiia Fuller,      I saw and evaluated the patient. I personally obtained the key and critical portions of the history and physical exam or was physically present for key and critical portions performed by the resident/fellow. I reviewed the resident/fellow's documentation and discussed the patient with the resident/fellow. I agree with the resident/fellow's medical decision making as documented in the note and made changes where appropriate.    Marce Bond MD    "

## 2024-07-31 NOTE — PATIENT INSTRUCTIONS
Recommend Minoxidil (Rogaine) 5% foam  Apply ONCE per day  [employer] / [employer] / [employer] / [employer] have the best prices

## 2024-08-02 ENCOUNTER — APPOINTMENT (OUTPATIENT)
Dept: PHYSICAL THERAPY | Facility: CLINIC | Age: 64
End: 2024-08-02
Payer: COMMERCIAL

## 2024-08-03 ENCOUNTER — LAB (OUTPATIENT)
Dept: LAB | Facility: LAB | Age: 64
End: 2024-08-03
Payer: COMMERCIAL

## 2024-08-03 DIAGNOSIS — L64.9 ANDROGENIC ALOPECIA: ICD-10-CM

## 2024-08-03 LAB — 25(OH)D3 SERPL-MCNC: 23 NG/ML (ref 30–100)

## 2024-08-03 PROCEDURE — 82306 VITAMIN D 25 HYDROXY: CPT

## 2024-08-03 PROCEDURE — 36415 COLL VENOUS BLD VENIPUNCTURE: CPT

## 2024-08-03 PROCEDURE — 82728 ASSAY OF FERRITIN: CPT

## 2024-08-04 LAB — FERRITIN SERPL-MCNC: 196 NG/ML (ref 8–150)

## 2024-08-08 ENCOUNTER — TELEPHONE (OUTPATIENT)
Dept: DERMATOLOGY | Facility: CLINIC | Age: 64
End: 2024-08-08
Payer: COMMERCIAL

## 2024-08-08 NOTE — TELEPHONE ENCOUNTER
Returned patient's call pertaining to blood work results. Made her aware once results are viewed an message with next steps will be sent via [employer]

## 2024-08-13 ENCOUNTER — TELEPHONE (OUTPATIENT)
Dept: DERMATOLOGY | Facility: CLINIC | Age: 64
End: 2024-08-13
Payer: COMMERCIAL

## 2024-08-13 NOTE — TELEPHONE ENCOUNTER
Pt left message that Dr Bond was going to get back to her through My Chart about her labs --Her ferritin was abnormal and her vit D low , she is wanting to take supplements and would like for Dr Bond to send message to her in My Chart ..

## 2024-08-15 ENCOUNTER — PATIENT MESSAGE (OUTPATIENT)
Dept: DERMATOLOGY | Facility: CLINIC | Age: 64
End: 2024-08-15
Payer: COMMERCIAL

## 2024-08-23 ENCOUNTER — APPOINTMENT (OUTPATIENT)
Dept: PHYSICAL THERAPY | Facility: CLINIC | Age: 64
End: 2024-08-23
Payer: COMMERCIAL

## 2024-09-05 ENCOUNTER — PATIENT MESSAGE (OUTPATIENT)
Dept: PRIMARY CARE | Facility: CLINIC | Age: 64
End: 2024-09-05
Payer: COMMERCIAL

## 2024-09-05 DIAGNOSIS — I10 PRIMARY HYPERTENSION: ICD-10-CM

## 2024-09-05 RX ORDER — LISINOPRIL 20 MG/1
20 TABLET ORAL DAILY
Qty: 90 TABLET | Refills: 0 | Status: SHIPPED | OUTPATIENT
Start: 2024-09-05 | End: 2024-09-05 | Stop reason: SDUPTHER

## 2024-09-05 RX ORDER — AMLODIPINE BESYLATE 5 MG/1
5 TABLET ORAL DAILY
Qty: 30 TABLET | Refills: 11 | Status: SHIPPED | OUTPATIENT
Start: 2024-09-05

## 2024-09-05 RX ORDER — AMLODIPINE BESYLATE 5 MG/1
5 TABLET ORAL DAILY
Qty: 90 TABLET | Refills: 0 | Status: SHIPPED | OUTPATIENT
Start: 2024-09-05 | End: 2024-09-05 | Stop reason: SDUPTHER

## 2024-09-05 RX ORDER — LISINOPRIL 20 MG/1
20 TABLET ORAL DAILY
Qty: 30 TABLET | Refills: 11 | Status: SHIPPED | OUTPATIENT
Start: 2024-09-05

## 2024-09-12 ENCOUNTER — TREATMENT (OUTPATIENT)
Dept: PHYSICAL THERAPY | Facility: CLINIC | Age: 64
End: 2024-09-12
Payer: COMMERCIAL

## 2024-09-12 DIAGNOSIS — S16.1XXA CERVICAL STRAIN, ACUTE, INITIAL ENCOUNTER: ICD-10-CM

## 2024-09-12 DIAGNOSIS — S46.811A TRAPEZIUS STRAIN, RIGHT, INITIAL ENCOUNTER: Primary | ICD-10-CM

## 2024-09-12 PROCEDURE — 97140 MANUAL THERAPY 1/> REGIONS: CPT | Mod: GP | Performed by: PHYSICAL THERAPIST

## 2024-09-12 NOTE — PROGRESS NOTES
PHYSICAL THERAPY TREATMENT NOTE    Patient Name:  Siobhan Escobar   Patient MRN: [id number]  Date: 09/12/24  Time Calculation  Start Time: 0530  Stop Time: 0615  Time Calculation (min): 45 min      Problem List Items Addressed This Visit             ICD-10-CM    Cervical strain, acute, initial encounter S16.1XXA    Trapezius strain, right, initial encounter - Primary S46.811A       Insurance:  Visit number: 8of 50  Insurance Type: Payor: ANTHEM / Plan: ANTHEM HMP / Product Type: *No Product type* /   Authorization or Plan of Care date Range:$35 COPAY 50 PT/OT/ST V PCY 0 USED NO AUTH NEEDES PAYS %     General:  Reason for visit: Episodic subacute Localized Right sided neck pain and limited neck R Rot,  R SB, and Extension.  Negative radiculopathy cluster.  R shoulder cleared  xray R shoulder is WNL  Referred by: Dr Tashi Novak MD appt:  5/7/24     Precautions:  Skin CA, CAD, HTN, vascular disease, anemia, OA of hands. Fibromuscular dysplasia - Abnormal cell growth in arteries - seeing MD about this.  Fall Risk: None    Assessment:    Siobhan Escobar has completed 8 physical therapy sessions from 5/6/2024 to 9/12/24 to address Subacute Localized Right sided neck pain and limited neck R Rot,  R SB, and Extension.  Treatment has included Therapeutic exercise, Manual therapy, Self care and home management, Instruction in activity modification, and Dry Needling.  she reports partial compliance with the instructed home exercises..  Siobhan has made significant progress towards the established therapy goals.  At this time I recommend Siobhan continue with physical therapy to address the following impairments: intermittent R sided neck pain.  With treatment to include Therapeutic exercise, Manual therapy, Home program instruction and progression, Neuromuscular re-education, Therapeutic activities, Self  care and home management, Instruction in activity modification, and Electrical stimulation.    R SB and R Rot are the two painful motions.    GOALS:   Short Term Goals:   -Patient to be Indep with Home Exercise Program for symptom management.  She has not been HEP complaint recently.     Long Term Goals:   -NDI: 0-10% impairment to indicate a significant improvement in overall function.    9/12: 14%      -DNF endurance test = 10 seconds to allow for correct cervical mechanics.     -Patient will demonstrate correct posture with min to no cueing to allow for correct loading strategy.  9/12: MET     -Patient will demo mild to no limitation AROM of the cervical spine to allow for correct mechanics with functional mobility.    Continues to have ROM limitation in R Rot and R SB     -Patient will report driving without pain to allow for return to work and ADLs without limitation.   9/12: R Rot is painful.       -Patient will report reading >30 min without pain to allow for return to work and ADLs without limitation.   9/12/24: neck pain is stable with work activities.    Outcome Measures:  Other Measures  Neck Disability Index: 14     Subjective:    Reports R sided neck pain is intermittent and seems to have flared up without injury about 3 weeks ago.  The month of August she has been feeling well.     Pain (0-10): 0-7/10;   Taking Tylenol/ibuprofen 1 x per day for pain.  Reports morning pain and stiffness.    Objective:  - POSTURE:   Posture assessment positive for: forward head shoulders rounded forward scapular protraction Increased thoracic kyphotic  Protruded head: Yes;     - DERMATIOMES UE:   Patient denies altered sensation in the UE extremities.  Dermatomes of the Upper Extremity are intact to light touch.    - PALPATION:   Siobhan's neck palpation exam findings consist of Right, Upper Trapezius, and Levator.    - NECK AROM MOVEMENT LOSS:  Retraction: Minimal  Extension: Moderate  Sidebending(R): Moderate.   "Ipsilateral pain.  Sidebending (L): Minimal.  Contralateral stretch.  Rotation (R): Minimal  Rotation (L): Nil    - UPPER EXTREMITY MUSCLE STRENGTH:  Upper Extremity Myotomes are WNL bilaterally    Additional Special Testing:  Special Test Cervical: .  Neck distraction: negative  Spurling R/L:  negative / negative     Treatment Performed: (\"NP\" = Not Performed)     Therapeutic Exercise:       minutes  Access Code: [email or web address]  UBE 2/2 - NP    **ACTIVITIES BELOW WERE NOT PERFORMED**   Seated chin tuck: increases pain  Seated SB isometrics 5\" x 10   Supine chin nod - feels awkward to patient    Seated ext using towel roll, \"rabbit ears\"    Seated Thoracic Lumbar Extension  - 10 reps - 3-5 second hold  Seated Levator Scapulae Stretch  - 7 x weekly - 3 reps - 30 seconds hold  Seated Upper Trapezius Stretch  -  3 reps - 30 second hold  Seated Assisted Cervical Rotation with Towel  - 1 sets - 5 reps - 5 second hold  Tband Row/LAE/ER/Horiz Abd 2 x 10 ea      Manual Therapy:     40 minutes  Seated STM/DTM to the R  UT/cervical paraspinals  Suboccipital release  IASTM to the R UT  Trial of Occipivot - patient may purchase.  Prone UPA R and L C4 - T2 - NP  Dry needling: bilateral segments C6/7, R UT, R levator - patient requested no dry needling.    Neuromuscular Re-education:      minutes      Gait Training:            minutes      Aquatics:            minutes      Therapeutic Activity:      minutes      Gait Training:            minutes      Modalities:       Vasopneumatic Device       minutes  Electrical Stimulation          minutes  Ultrasound            minutes  Iontophoresis                     minutes  Cold Pack            minutes  Mechanical Traction           minutes    Self Care Home Management:    minutes    Canalith Reposition:          minutes     Education:          minutes    Other:       minutes      Evaluation Complexity: Low:   minutes; Moderate   minutes; Complex   minutes    Re-Evaluation:   " minutes    .[email or web address]

## 2024-10-08 ENCOUNTER — TREATMENT (OUTPATIENT)
Dept: PHYSICAL THERAPY | Facility: CLINIC | Age: 64
End: 2024-10-08
Payer: COMMERCIAL

## 2024-10-08 DIAGNOSIS — S16.1XXA CERVICAL STRAIN, ACUTE, INITIAL ENCOUNTER: ICD-10-CM

## 2024-10-08 DIAGNOSIS — S46.811A TRAPEZIUS STRAIN, RIGHT, INITIAL ENCOUNTER: Primary | ICD-10-CM

## 2024-10-08 PROCEDURE — 97140 MANUAL THERAPY 1/> REGIONS: CPT | Mod: GP | Performed by: PHYSICAL THERAPIST

## 2024-10-08 NOTE — PROGRESS NOTES
PHYSICAL THERAPY TREATMENT NOTE    Patient Name:  Siobhan Escobar   Patient MRN: [id number]  Date: 10/08/24  Time Calculation  Start Time: 1402  Stop Time: 1440  Time Calculation (min): 38 min      Problem List Items Addressed This Visit             ICD-10-CM    Cervical strain, acute, initial encounter S16.1XXA    Relevant Orders    Follow Up In Physical Therapy    Trapezius strain, right, initial encounter - Primary S46.811A    Relevant Orders    Follow Up In Physical Therapy       Insurance:  Visit number: 9of 50  Insurance Type: Payor: ANTHEM / Plan: ANTHEM HMP / Product Type: *No Product type* /   Authorization or Plan of Care date Range:$35 COPAY 50 PT/OT/ST V PCY 0 USED NO AUTH NEEDES PAYS %     General:  Reason for visit: Episodic subacute Localized Right sided neck pain and limited neck R Rot,  R SB, and Extension.  Negative radiculopathy cluster.  R shoulder cleared  xray R shoulder is WNL  Referred by: Dr Tashi Novak MD appt:  5/7/24     Precautions:  Skin CA, CAD, HTN, vascular disease, anemia, OA of hands. Fibromuscular dysplasia - Abnormal cell growth in arteries - seeing MD about this.  Fall Risk: None    Subjective:    Pasty 3-4 weeks neck has been doing well.  She is having some morning pain and stiffness.  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  Her neck is not uncomfortable with physical activity.  Patient reports Reduced frequency of pain. Reduced intensity of pain. Reduced pain level.   Pain (0-10): 3    HEP adherence / understanding: partial compliance with the instructed home exercises.    Assessment:   Education: Reviewed home exercise program.  Progress towards functional goals: Reduced frequency of pain. Reduced intensity of pain. Reduced pain level.  Response to interventions: Patient tolerated therapeutic  interventions well and without any adverse events.  Justification for continued skilled care: To address remaining functional, objective and subjective deficits to allow the patient to return to full independence with ADLs.    Plan:  Continue with PT PRN, we agreend to 1 x per month for 6 more sessions.   RECALLED:   Objective:  - POSTURE:   Posture assessment positive for: forward head shoulders rounded forward scapular protraction Increased thoracic kyphotic  Protruded head: Yes;     - DERMATIOMES UE:   Patient denies altered sensation in the UE extremities.  Dermatomes of the Upper Extremity are intact to light touch.    - PALPATION:   Siobhan's neck palpation exam findings consist of Right, Upper Trapezius, and Levator.    - NECK AROM MOVEMENT LOSS:  Retraction: Minimal  Extension: Moderate  Sidebending(R): Moderate.  Ipsilateral pain.  Sidebending (L): Minimal.  Contralateral stretch.  Rotation (R): Minimal  Rotation (L): Nil    - UPPER EXTREMITY MUSCLE STRENGTH:  Upper Extremity Myotomes are WNL bilaterally    Additional Special Testing:  Special Test Cervical: .  Neck distraction: negative  Spurling R/L:  negative / negative       GOALS:   Short Term Goals:   -Patient to be Indep with Home Exercise Program for symptom management.  She has not been HEP complaint recently.     Long Term Goals:   -NDI: 0-10% impairment to indicate a significant improvement in overall function.    9/12: 14%      -DNF endurance test = 10 seconds to allow for correct cervical mechanics.     -Patient will demonstrate correct posture with min to no cueing to allow for correct loading strategy.  9/12: MET     -Patient will demo mild to no limitation AROM of the cervical spine to allow for correct mechanics with functional mobility.    Continues to have ROM limitation in R Rot and R SB     -Patient will report driving without pain to allow for return to work and ADLs without limitation.   9/12: R Rot is painful.       -Patient will  "report reading >30 min without pain to allow for return to work and ADLs without limitation.   9/12/24: neck pain is stable with work activities.    Treatment Performed: (\"NP\" = Not Performed)     Therapeutic Exercise:       minutes  Access Code: [email or web address]  UBE 2/2 - NP    **ACTIVITIES BELOW WERE NOT PERFORMED**   Seated chin tuck: increases pain  Seated SB isometrics 5\" x 10   Supine chin nod - feels awkward to patient    Seated ext using towel roll, \"rabbit ears\"    Seated Thoracic Lumbar Extension  - 10 reps - 3-5 second hold  Seated Levator Scapulae Stretch  - 7 x weekly - 3 reps - 30 seconds hold  Seated Upper Trapezius Stretch  -  3 reps - 30 second hold  Seated Assisted Cervical Rotation with Towel  - 1 sets - 5 reps - 5 second hold  Tband Row/LAE/ER/Horiz Abd 2 x 10 ea      Manual Therapy:     38 minutes  Seated STM/DTM to the R  UT/cervical paraspinals  Suboccipital release  IASTM to the R UT - NP.  Prone UPA R and L C4 - T2  Dry needling: bilateral segments C6/7, R UT, R levator - Discontinued - patient requested no dry needling.    Neuromuscular Re-education:      minutes      Gait Training:            minutes      Aquatics:            minutes      Therapeutic Activity:      minutes      Gait Training:            minutes      Modalities:       Vasopneumatic Device       minutes  Electrical Stimulation          minutes  Ultrasound            minutes  Iontophoresis                     minutes  Cold Pack            minutes  Mechanical Traction           minutes    Self Care Home Management:    minutes    Canalith Reposition:          minutes     Education:          minutes    Other:       minutes      Evaluation Complexity: Low:   minutes; Moderate   minutes; Complex   minutes    Re-Evaluation:   minutes    .tjpobjlum     "

## 2024-11-20 ENCOUNTER — OFFICE VISIT (OUTPATIENT)
Dept: DERMATOLOGY | Facility: CLINIC | Age: 64
End: 2024-11-20
Payer: COMMERCIAL

## 2024-11-20 DIAGNOSIS — L82.0 INFLAMED SEBORRHEIC KERATOSIS: Primary | ICD-10-CM

## 2024-11-20 DIAGNOSIS — L63.9 ALOPECIA AREATA: ICD-10-CM

## 2024-11-20 PROCEDURE — 1159F MED LIST DOCD IN RCRD: CPT | Performed by: DERMATOLOGY

## 2024-11-20 PROCEDURE — 99213 OFFICE O/P EST LOW 20 MIN: CPT | Performed by: DERMATOLOGY

## 2024-11-20 NOTE — PROGRESS NOTES
Subjective     Siobhan Escobar is a 63 y.o. female who presents for the following: Suspicious Skin Lesion (Suspicious Skin Lesions located on Back for 2 weeks. Pt reports irritated and bleeding. Hx of AK. ).     Skin Lesion  She describes it as a spot, that is located on her back.  It was first noticed 2 weeks ago. It has been causing  irritated  and is not changing and bleeding. Previously this spot has not been  treated .  Other spots that are concerning: None      Review of Systems:  No other skin or systemic complaints other than what is documented elsewhere in the note.    The following portions of the chart were reviewed this encounter and updated as appropriate:       Skin Cancer History  No skin cancer on file.    Specialty Problems          Dermatology Problems    Hair loss     Past Medical History:  Siobhan Escobar  has a past medical history of Acute recurrent pansinusitis, Acute recurrent pansinusitis (03/23/2018), Acute upper respiratory infection, unspecified (12/21/2021), Chondrocostal junction syndrome (tietze) (01/23/2016), Conjunctival hemorrhage of left eye (12/08/2011), Decreased white blood cell count, unspecified (05/28/2020), Encounter for immunization (05/20/2021), Encounter for other screening for malignant neoplasm of breast (02/28/2019), Encounter for screening for cardiovascular disorders (02/28/2019), Perirectal skin irritation (08/01/2023), Personal history of other diseases of the digestive system (03/27/2019), Personal history of other drug therapy (02/28/2019), Personal history of other infectious and parasitic diseases (05/18/2020), Personal history of other infectious and parasitic diseases (07/02/2020), Personal history of other specified conditions (09/03/2019), Personal history of other specified conditions (07/02/2020), Rash and other nonspecific skin eruption (08/08/2021), Unspecified otitis externa, left ear (07/17/2018), and Unspecified symptoms and signs involving the  "genitourinary system (10/18/2021).    Past Surgical History:  Siobhan Escobar  has no past surgical history on file.    Family History:  Patient family history includes No Known Problems in her mother.       Objective   Well appearing patient in no apparent distress; mood and affect are within normal limits.    A focused skin examination was performed. All findings within normal limits unless otherwise noted below.    Assessment/Plan   1. Inflamed seborrheic keratosis  Left Upper Back  Scaly hyperkeratotic irritated plaque c/w ISK    The benign nature of the diagnosis was explained to patient. However, given irritation, offered LN2. Risks, benefits, side effects, alternatives and options were discussed with patient and the patient voiced understanding. Patient agrees to LN2 treatment of lesions in clinic today and is aware multiple treatments may be needed.   Pt agrees with plan as above.     ISK x 1 treated with LN2 x 3 cycles; wound care d/w pt and pt expressed understanding.       2. Alopecia areata  Right Occipital Scalp  Smooth, circular areas of non-scarring hair loss. One focal patch on right occipital scalp with partial regrowth    -Discussed the nature of the condition  -Discussed treatment options    - see \"androgenic alopecia\" for plan from previous note- agreed with previous treatment plan including slowly increasing Vitamin D. Pt agrees with plan as above.       Follow up as needed.   "

## 2024-11-21 ENCOUNTER — APPOINTMENT (OUTPATIENT)
Dept: PHYSICAL THERAPY | Facility: CLINIC | Age: 64
End: 2024-11-21
Payer: COMMERCIAL

## 2024-12-19 ENCOUNTER — TREATMENT (OUTPATIENT)
Dept: PHYSICAL THERAPY | Facility: CLINIC | Age: 64
End: 2024-12-19
Payer: COMMERCIAL

## 2024-12-19 DIAGNOSIS — S46.811A TRAPEZIUS STRAIN, RIGHT, INITIAL ENCOUNTER: Primary | ICD-10-CM

## 2024-12-19 DIAGNOSIS — S16.1XXA CERVICAL STRAIN, ACUTE, INITIAL ENCOUNTER: ICD-10-CM

## 2024-12-19 PROCEDURE — 97140 MANUAL THERAPY 1/> REGIONS: CPT | Mod: GP | Performed by: PHYSICAL THERAPIST

## 2024-12-19 NOTE — PROGRESS NOTES
PHYSICAL THERAPY TREATMENT NOTE    Patient Name:  Siobhan Escobar   Patient MRN: [id number]  Date: 12/19/24  Time Calculation  Start Time: 1239  Stop Time: 1319  Time Calculation (min): 40 min      Problem List Items Addressed This Visit             ICD-10-CM    Cervical strain, acute, initial encounter S16.1XXA    Trapezius strain, right, initial encounter - Primary S46.811A     Insurance:  Visit number: 10of 50  Insurance Type: Payor: ANTHEM / Plan: ANTHEM HMP / Product Type: *No Product type* /   Authorization or Plan of Care date Range:$35 COPAY 50 PT/OT/ST V PCY 0 USED NO AUTH NEEDES PAYS %     General:  Reason for visit: Episodic subacute Localized Right sided neck pain and limited neck R Rot,  R SB, and Extension.  Negative radiculopathy cluster.  R shoulder cleared  xray R shoulder is WNL  Referred by: Dr Tashi Novak MD appt:  5/7/24     Precautions:  Skin CA, CAD, HTN, vascular disease, anemia, OA of hands. Fibromuscular dysplasia - Abnormal cell growth in arteries - seeing MD about this.  Fall Risk: None    Subjective:    Past few weeks neck has been irritable, perhaps due to stress?  She has not been doing Zoomba, but is going to start back up and try to do it a couple days/week.  She feels the manual therapy helps.   Pain (0-10): 4    HEP adherence / understanding: partial compliance with the instructed home exercises.    Assessment:   Education: Reviewed home exercise program.  Progress towards functional goals:  PT reduces her pain and symptoms  Response to interventions:  Reduced pain and improved ROM post session.  Justification for continued skilled care: To address remaining functional, objective and subjective deficits to allow the patient to return to full independence with ADLs.    Plan:  Manual therapy for pain reduction and joint mobility.    Objective:       RECALLED:   Objective:  - POSTURE:   Posture assessment positive for: forward head shoulders rounded forward scapular protraction Increased thoracic kyphotic  Protruded head: Yes;     - DERMATIOMES UE:   Patient denies altered sensation in the UE extremities.  Dermatomes of the Upper Extremity are intact to light touch.    - PALPATION:   Siobhan's neck palpation exam findings consist of Right, Upper Trapezius, and Levator.    - NECK AROM MOVEMENT LOSS:  Retraction: Minimal  Extension: Moderate  Sidebending(R): Moderate.  Ipsilateral pain.  Sidebending (L): Minimal.  Contralateral stretch.  Rotation (R): Minimal  Rotation (L): Nil    - UPPER EXTREMITY MUSCLE STRENGTH:  Upper Extremity Myotomes are WNL bilaterally    Additional Special Testing:  Special Test Cervical: .  Neck distraction: negative  Spurling R/L:  negative / negative       GOALS:   Short Term Goals:   -Patient to be Indep with Home Exercise Program for symptom management.  She has not been HEP complaint recently.  12/19/24: MET     Long Term Goals:   -NDI: 0-10% impairment to indicate a significant improvement in overall function.    9/12: 14%   12/19/24: Not reassessed.     -DNF endurance test = 10 seconds to allow for correct cervical mechanics.  12/19/24: Not reassessed.     -Patient will demonstrate correct posture with min to no cueing to allow for correct loading strategy.  9/12: MET     -Patient will demo mild to no limitation AROM of the cervical spine to allow for correct mechanics with functional mobility.    Continues to have ROM limitation in R Rot and R SB  12/19/24: R rotation is slightly limited with associated pain.     -Patient will report driving without pain to allow for return to work and ADLs without limitation.   9/12: R Rot is painful.    12/19/24: improved post session.     -Patient will report reading >30 min without pain to allow for return to work and ADLs without limitation.   9/12/24: neck pain is stable with work  "activities.  12/19/24: modifies activities at home.  Good Progress.    Treatment Performed: (\"NP\" = Not Performed)     Therapeutic Exercise:       minutes  Access Code: [email or web address]  UBE 2/2 - NP    **ACTIVITIES BELOW WERE NOT PERFORMED**   Seated chin tuck: increases pain  Seated SB isometrics 5\" x 10   Supine chin nod - feels awkward to patient    Seated ext using towel roll, \"rabbit ears\"    Seated Thoracic Lumbar Extension  - 10 reps - 3-5 second hold  Seated Levator Scapulae Stretch  - 7 x weekly - 3 reps - 30 seconds hold  Seated Upper Trapezius Stretch  -  3 reps - 30 second hold  Seated Assisted Cervical Rotation with Towel  - 1 sets - 5 reps - 5 second hold  Tband Row/LAE/ER/Horiz Abd 2 x 10 ea      Manual Therapy:     40 minutes  Seated STM/DTM to the R  UT/cervical paraspinals  Suboccipital release  IASTM to the R UT - NP.  Prone UPA R and L C4 - T2  Dry needling: bilateral segments C6/7, R UT, R levator - Discontinued - patient requested no dry needling.    Neuromuscular Re-education:      minutes      Gait Training:            minutes      Aquatics:            minutes      Therapeutic Activity:      minutes      Gait Training:            minutes      Modalities:       Vasopneumatic Device       minutes  Electrical Stimulation          minutes  Ultrasound            minutes  Iontophoresis                     minutes  Cold Pack            minutes  Mechanical Traction           minutes    Self Care Home Management:    minutes    Canalith Reposition:          minutes     Education:          minutes    Other:       minutes      Evaluation Complexity: Low:   minutes; Moderate   minutes; Complex   minutes    Re-Evaluation:   minutes    .tjpobjlum     "

## 2025-01-14 ENCOUNTER — APPOINTMENT (OUTPATIENT)
Dept: PRIMARY CARE | Facility: CLINIC | Age: 65
End: 2025-01-14
Payer: COMMERCIAL

## 2025-01-17 ENCOUNTER — APPOINTMENT (OUTPATIENT)
Dept: PHYSICAL THERAPY | Facility: CLINIC | Age: 65
End: 2025-01-17
Payer: COMMERCIAL

## 2025-01-17 DIAGNOSIS — S16.1XXA CERVICAL STRAIN, ACUTE, INITIAL ENCOUNTER: ICD-10-CM

## 2025-01-17 DIAGNOSIS — S46.811A TRAPEZIUS STRAIN, RIGHT, INITIAL ENCOUNTER: Primary | ICD-10-CM

## 2025-01-17 PROCEDURE — 97140 MANUAL THERAPY 1/> REGIONS: CPT | Mod: GP | Performed by: PHYSICAL THERAPIST

## 2025-01-17 NOTE — PROGRESS NOTES
PHYSICAL THERAPY TREATMENT NOTE    Patient Name:  Siobhan Escobar   Patient MRN: [id number]  Date: 01/17/25  Time Calculation  Start Time: 1135  Stop Time: 1210  Time Calculation (min): 35 min      Problem List Items Addressed This Visit             ICD-10-CM    Cervical strain, acute, initial encounter S16.1XXA    Relevant Orders    Follow Up In Physical Therapy    Trapezius strain, right, initial encounter - Primary S46.811A    Relevant Orders    Follow Up In Physical Therapy       Insurance:  Visit number: 11of 50  Insurance Type: Payor: ANTHEM / Plan: ANTHEM HMP / Product Type: *No Product type* /   Authorization or Plan of Care date Range:$35 COPAY 50 PT/OT/ST V PCY 0 USED NO AUTH NEEDES PAYS %     General:  Reason for visit: Episodic subacute Localized Right sided neck pain and limited neck R Rot,  R SB, and Extension.  Negative radiculopathy cluster.  R shoulder cleared  xray R shoulder is WNL  Referred by: Dr Tashi Novak MD appt:  5/7/24     Precautions:  Skin CA, CAD, HTN, vascular disease, anemia, OA of hands. Fibromuscular dysplasia - Abnormal cell growth in arteries - seeing MD about this.  Fall Risk: None    Subjective:    Has not exercises much recently. Today pain is 5/10.   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  When carrying heavier items for work it may be aggravating her neck.   HEP adherence / understanding: partial compliance with the instructed home exercises.    Assessment:  Pain CT junction today.  Rated as moderate.    Has not been doing much exercise due to cold weather.  Feels it is improving.    Education:   Advised her to continue to exercise witih Zoomba 2- 3 x per week.  Progress towards functional goals: Reduced intensity of pain. Reduced pain level.  Response to interventions: Improved joint mobility.  Reduced pain post treatment.  Justification for continued skilled care:  To address remaining functional, objective and subjective deficits to allow the patient to return to full independence with ADLs.    Plan:  Exercises targeting surrounding musculature to strengthen and improve function. Manual therapy to improve joint mobility.    Objective:   RECALLED  - POSTURE:   Posture assessment positive for: forward head shoulders rounded forward scapular protraction Increased thoracic kyphotic  Protruded head: Yes;     - DERMATIOMES UE:   Patient denies altered sensation in the UE extremities.  Dermatomes of the Upper Extremity are intact to light touch.    - PALPATION:   Siobhan's neck palpation exam findings consist of Right, Upper Trapezius, and Levator.    - NECK AROM MOVEMENT LOSS:  Retraction: Minimal  Extension: Moderate  Sidebending(R): Moderate.  Ipsilateral pain.  Sidebending (L): Minimal.  Contralateral stretch.  Rotation (R): Minimal  Rotation (L): Nil    - UPPER EXTREMITY MUSCLE STRENGTH:  Upper Extremity Myotomes are WNL bilaterally    Additional Special Testing:  Special Test Cervical: .  Neck distraction: negative  Spurling R/L:  negative / negative       GOALS:   Short Term Goals:   -Patient to be Indep with Home Exercise Program for symptom management.  She has not been HEP complaint recently.  12/19/24: MET     Long Term Goals:   -NDI: 0-10% impairment to indicate a significant improvement in overall function.    9/12: 14%   12/19/24: Not reassessed.     -DNF endurance test = 10 seconds to allow for correct cervical mechanics.  12/19/24: Not reassessed.     -Patient will demonstrate correct posture with min to no cueing to allow for correct loading strategy.  9/12: MET     -Patient will demo mild to no limitation AROM of the cervical spine to allow for correct mechanics with functional mobility.    Continues to have ROM limitation in R Rot and R SB  12/19/24: R rotation is slightly limited with associated pain.     -Patient will report driving without pain to allow for  "return to work and ADLs without limitation.   9/12: R Rot is painful.    12/19/24: improved post session.     -Patient will report reading >30 min without pain to allow for return to work and ADLs without limitation.   9/12/24: neck pain is stable with work activities.  12/19/24: modifies activities at home.  Good Progress.    Treatment Performed: (\"NP\" = Not Performed)     Therapeutic Exercise:       minutes  Access Code: [email or web address]  UBE 2/2 - NP    **ACTIVITIES BELOW WERE NOT PERFORMED**   Seated chin tuck: increases pain  Seated SB isometrics 5\" x 10   Supine chin nod - feels awkward to patient    Seated ext using towel roll, \"rabbit ears\"    Seated Thoracic Lumbar Extension  - 10 reps - 3-5 second hold  Seated Levator Scapulae Stretch  - 7 x weekly - 3 reps - 30 seconds hold  Seated Upper Trapezius Stretch  -  3 reps - 30 second hold  Seated Assisted Cervical Rotation with Towel  - 1 sets - 5 reps - 5 second hold  Tband Row/LAE/ER/Horiz Abd 2 x 10 ea      Manual Therapy:     35 minutes  Seated STM/DTM to the R  UT/cervical paraspinals  Suboccipital release  IASTM to the R UT - NP.  Prone UPA R and L C4 - T2  Dry needling: bilateral segments C6/7, R UT, R levator - Discontinued - patient requested no dry needling.    Neuromuscular Re-education:      minutes      Gait Training:            minutes      Aquatics:            minutes      Therapeutic Activity:      minutes      Gait Training:            minutes      Modalities:       Vasopneumatic Device       minutes  Electrical Stimulation          minutes  Ultrasound            minutes  Iontophoresis                     minutes  Cold Pack            minutes  Mechanical Traction           minutes    Self Care Home Management:    minutes    Canalith Reposition:          minutes     Education:          minutes    Other:       minutes      Evaluation Complexity: Low:   minutes; Moderate   minutes; Complex   minutes    Re-Evaluation:   minutes    .tjpobjlum     "

## 2025-02-11 ENCOUNTER — APPOINTMENT (OUTPATIENT)
Dept: DERMATOLOGY | Facility: CLINIC | Age: 65
End: 2025-02-11
Payer: COMMERCIAL

## 2025-02-11 DIAGNOSIS — L21.9 SEBORRHEIC DERMATITIS: Primary | ICD-10-CM

## 2025-02-11 DIAGNOSIS — L60.9 NAIL DISORDER: ICD-10-CM

## 2025-02-11 DIAGNOSIS — L63.9 ALOPECIA AREATA: ICD-10-CM

## 2025-02-11 DIAGNOSIS — L64.9 ANDROGENIC ALOPECIA: ICD-10-CM

## 2025-02-11 PROCEDURE — 1036F TOBACCO NON-USER: CPT | Performed by: DERMATOLOGY

## 2025-02-11 PROCEDURE — 1159F MED LIST DOCD IN RCRD: CPT | Performed by: DERMATOLOGY

## 2025-02-11 PROCEDURE — 1160F RVW MEDS BY RX/DR IN RCRD: CPT | Performed by: DERMATOLOGY

## 2025-02-11 PROCEDURE — 99213 OFFICE O/P EST LOW 20 MIN: CPT | Performed by: DERMATOLOGY

## 2025-02-11 ASSESSMENT — DERMATOLOGY PATIENT ASSESSMENT
DO YOU USE A TANNING BED: NO
ARE YOU AN ORGAN TRANSPLANT RECIPIENT: NO
HAVE YOU HAD OR DO YOU HAVE A STAPH INFECTION: NO
DO YOU USE SUNSCREEN: OCCASIONALLY
ARE YOU TRYING TO GET PREGNANT: NO
ARE YOU ON BIRTH CONTROL: NO
HAVE YOU HAD OR DO YOU HAVE VASCULAR DISEASE: YES
DO YOU HAVE ANY NEW OR CHANGING LESIONS: NO
DO YOU HAVE IRREGULAR MENSTRUAL CYCLES: NO

## 2025-02-11 ASSESSMENT — DERMATOLOGY QUALITY OF LIFE (QOL) ASSESSMENT
DATE THE QUALITY-OF-LIFE ASSESSMENT WAS COMPLETED: 67247
RATE HOW BOTHERED YOU ARE BY SYMPTOMS OF YOUR SKIN PROBLEM (EG, ITCHING, STINGING BURNING, HURTING OR SKIN IRRITATION): 0 - NEVER BOTHERED
RATE HOW BOTHERED YOU ARE BY EFFECTS OF YOUR SKIN PROBLEMS ON YOUR ACTIVITIES (EG, GOING OUT, ACCOMPLISHING WHAT YOU WANT, WORK ACTIVITIES OR YOUR RELATIONSHIPS WITH OTHERS): 0 - NEVER BOTHERED
WHAT SINGLE SKIN CONDITION LISTED BELOW IS THE PATIENT ANSWERING THE QUALITY-OF-LIFE ASSESSMENT QUESTIONS ABOUT: ALOPECIA
ARE THERE EXCLUSIONS OR EXCEPTIONS FOR THE QUALITY OF LIFE ASSESSMENT: NO
RATE HOW EMOTIONALLY BOTHERED YOU ARE BY YOUR SKIN PROBLEM (FOR EXAMPLE, WORRY, EMBARRASSMENT, FRUSTRATION): 0 - NEVER BOTHERED

## 2025-02-11 ASSESSMENT — ITCH NUMERIC RATING SCALE: HOW SEVERE IS YOUR ITCHING?: 0

## 2025-02-11 ASSESSMENT — PATIENT GLOBAL ASSESSMENT (PGA): PATIENT GLOBAL ASSESSMENT: PATIENT GLOBAL ASSESSMENT:  1 - CLEAR

## 2025-02-11 NOTE — PROGRESS NOTES
Subjective     Siobhan Escobar is a 64 y.o. female who presents for the following: Alopecia and Nail Problem (brittel). Last derm visit 11/20/24 with Dr. Purvis for inflamed seborrheic keratosis and alopecia areata    Labs 7/15/24: Iron %sat 17 (low), rest of panel normal, TSH normal  Labs 8/3/24: Ferritin 196 (high), Vit D 23 (low, but higher than previously)    600-800 international units  daily of vit D were recommended.  She started taking vital nutritive hair complex - a full dose is 2 capsules with 20 mcg of Vit D and 14.5mg of iron and 5000 mcg biotin - she has only been taking 1 capsule due to colon issues and constipation    She is using oh my growth hair topical with vitamins and naturals using a hair brush. Using biotin strengthening shampoo and conditioner with vitamins and naturals. Using rosemary shampoos    She did not start the minoxidil, she did not want to do that    Review of Systems:  No other skin or systemic complaints other than what is documented elsewhere in the note.    The following portions of the chart were reviewed this encounter and updated as appropriate:   Tobacco  Allergies  Meds  Problems  Med Hx  Surg Hx         Skin Cancer History  No skin cancer on file.      Specialty Problems          Dermatology Problems    Hair loss        Objective   Well appearing patient in no apparent distress; mood and affect are within normal limits.    A focused skin examination was performed. All findings within normal limits unless otherwise noted below.    Assessment/Plan   1. Seborrheic dermatitis  Scalp  Clear on exam today    Continue using rosemary and natural shampoos, scalp appears healthy    2. Androgenic alopecia  Scalp  Patch of alopecia on the right occipital hairline with some regrowth noted. Diffuse thinning. No erythema or scale. Crown is stable compared to photos 7/2024    Multifactorial due to combination of alopecia areata and androgenetic alopecia  -Patch of alopecia areata  "on the right occipital scalp with some regrowth.     - Labs 7/15/24: Iron %sat 17 (low), rest of panel normal, TSH normal  - Labs 8/3/24: Ferritin 196 (high), Vit D 23 (low, but higher than previously)  - Recheck iron studies and vit D since she is taking supplements for both    - continue botanicals for now  - declines using minoxidil topically    Related Procedures  Follow Up In Dermatology - Established Patient  Iron and TIBC  Ferritin  Vitamin D 25-Hydroxy,Total (for eval of Vitamin D levels)  Follow Up In Dermatology - Established Patient    3. Alopecia areata  Right Occipital Scalp  Smooth, circular areas of non-scarring hair loss. One focal patch on right occipital scalp with partial regrowth    -Discussed the nature of the condition  -Discussed treatment options    - see \"androgenic alopecia\" for plan    4. Nail disorder (2)  Left Hand - Posterior, Right Hand - Posterior  V-nicking on bilateral 5th digits. Longitudinal ridges on all nails    -Discussed that thinning of the nails can also occur with age.     - she is taking biotin  - she does not like using nail lacquers when baking  - try urea 10% cream          Follow up 6 months alopecia  "

## 2025-02-14 LAB
25(OH)D3+25(OH)D2 SERPL-MCNC: 19 NG/ML (ref 30–100)
FERRITIN SERPL-MCNC: 153 NG/ML (ref 16–288)
IRON SATN MFR SERPL: 23 % (CALC) (ref 16–45)
IRON SERPL-MCNC: 88 MCG/DL (ref 45–160)
TIBC SERPL-MCNC: 383 MCG/DL (CALC) (ref 250–450)

## 2025-02-17 ENCOUNTER — PATIENT MESSAGE (OUTPATIENT)
Dept: DERMATOLOGY | Facility: CLINIC | Age: 65
End: 2025-02-17
Payer: COMMERCIAL

## 2025-02-21 ENCOUNTER — APPOINTMENT (OUTPATIENT)
Dept: PHYSICAL THERAPY | Facility: CLINIC | Age: 65
End: 2025-02-21
Payer: COMMERCIAL

## 2025-03-21 ENCOUNTER — APPOINTMENT (OUTPATIENT)
Dept: PHYSICAL THERAPY | Facility: CLINIC | Age: 65
End: 2025-03-21
Payer: COMMERCIAL

## 2025-04-02 DIAGNOSIS — E55.9 VITAMIN D DEFICIENCY: Primary | ICD-10-CM

## 2025-04-02 DIAGNOSIS — E78.2 MIXED HYPERLIPIDEMIA: ICD-10-CM

## 2025-04-06 LAB
25(OH)D3+25(OH)D2 SERPL-MCNC: 30 NG/ML (ref 30–100)
CHOLEST SERPL-MCNC: 245 MG/DL
CHOLEST/HDLC SERPL: 2.3 (CALC)
HDLC SERPL-MCNC: 108 MG/DL
LDLC SERPL CALC-MCNC: 122 MG/DL (CALC)
NONHDLC SERPL-MCNC: 137 MG/DL (CALC)
TRIGL SERPL-MCNC: 49 MG/DL

## 2025-04-10 ENCOUNTER — APPOINTMENT (OUTPATIENT)
Dept: PRIMARY CARE | Facility: CLINIC | Age: 65
End: 2025-04-10
Payer: COMMERCIAL

## 2025-04-10 ENCOUNTER — TREATMENT (OUTPATIENT)
Dept: PHYSICAL THERAPY | Facility: CLINIC | Age: 65
End: 2025-04-10
Payer: COMMERCIAL

## 2025-04-10 VITALS
DIASTOLIC BLOOD PRESSURE: 80 MMHG | BODY MASS INDEX: 22.71 KG/M2 | SYSTOLIC BLOOD PRESSURE: 160 MMHG | WEIGHT: 133 LBS | HEIGHT: 64 IN | HEART RATE: 68 BPM | OXYGEN SATURATION: 98 %

## 2025-04-10 DIAGNOSIS — Z12.31 BREAST CANCER SCREENING BY MAMMOGRAM: Primary | ICD-10-CM

## 2025-04-10 DIAGNOSIS — S16.1XXA CERVICAL STRAIN, ACUTE, INITIAL ENCOUNTER: ICD-10-CM

## 2025-04-10 DIAGNOSIS — S46.811A TRAPEZIUS STRAIN, RIGHT, INITIAL ENCOUNTER: Primary | ICD-10-CM

## 2025-04-10 PROCEDURE — 3077F SYST BP >= 140 MM HG: CPT | Performed by: STUDENT IN AN ORGANIZED HEALTH CARE EDUCATION/TRAINING PROGRAM

## 2025-04-10 PROCEDURE — 3008F BODY MASS INDEX DOCD: CPT | Performed by: STUDENT IN AN ORGANIZED HEALTH CARE EDUCATION/TRAINING PROGRAM

## 2025-04-10 PROCEDURE — 1160F RVW MEDS BY RX/DR IN RCRD: CPT | Performed by: STUDENT IN AN ORGANIZED HEALTH CARE EDUCATION/TRAINING PROGRAM

## 2025-04-10 PROCEDURE — 1036F TOBACCO NON-USER: CPT | Performed by: STUDENT IN AN ORGANIZED HEALTH CARE EDUCATION/TRAINING PROGRAM

## 2025-04-10 PROCEDURE — 99397 PER PM REEVAL EST PAT 65+ YR: CPT | Performed by: STUDENT IN AN ORGANIZED HEALTH CARE EDUCATION/TRAINING PROGRAM

## 2025-04-10 PROCEDURE — 97110 THERAPEUTIC EXERCISES: CPT | Mod: GP | Performed by: PHYSICAL THERAPIST

## 2025-04-10 PROCEDURE — 3079F DIAST BP 80-89 MM HG: CPT | Performed by: STUDENT IN AN ORGANIZED HEALTH CARE EDUCATION/TRAINING PROGRAM

## 2025-04-10 PROCEDURE — 1159F MED LIST DOCD IN RCRD: CPT | Performed by: STUDENT IN AN ORGANIZED HEALTH CARE EDUCATION/TRAINING PROGRAM

## 2025-04-10 RX ORDER — VIT C/E/ZN/COPPR/LUTEIN/ZEAXAN 250MG-90MG
25 CAPSULE ORAL DAILY
COMMUNITY

## 2025-04-10 NOTE — PROGRESS NOTES
Subjective   Patient ID: Siobhan Escobar is a 64 y.o. female who presents for Annual Exam (Annual physical/Has been concerned about her bp lately so she has been recording it. Is anxious so leading up to appointment her readings get higher. Was watching the today show and heard about a new medication called lorundrostat and wanted to know if that was something she should try. ).  History of Present Illness  Siobhan Escobar is a 64 year old female who presents for a routine follow-up regarding her cholesterol and vitamin D levels.    Cholesterol levels have remained relatively stable over the past year, with a slight decrease in non-LDL cholesterol from 140 to 137, though it remains elevated. HDL cholesterol is favorable, likely due to her regular physical activity. She is making dietary adjustments by incorporating more fruits and vegetables while maintaining a preference for lean meats such as chicken, turkey, and beef. She also consumes low-nitrate lunch meats and adds fruit to her salads.    Vitamin D levels have shown improvement with supplementation. She takes an additional vitamin that has helped regulate her previously elevated iron levels. After running out of this vitamin in March, she noticed a decline in nail health and energy levels. She is contemplating repurchasing it despite its cost, as she believes it aids her energy levels and iron balance.    She experiences occasional elevated blood pressure readings, particularly in clinical settings, but typically records readings around 130 at home. She attributes higher readings to anxiety and reports no blood pressure issues during exercise. Her physical activities include Randi for cardiovascular fitness and square dancing for mental and coordination challenges.    She experiences nocturnal leg cramps, predominantly in the right leg, which sometimes require walking or stretching to alleviate. She speculates a connection to her fibromuscular dysplasia or  "varicose veins and is considering increasing water intake and magnesium supplementation to address this issue.    She plans to undergo a colonoscopy and has a family history of a tortuous colon. She experiences dehydration and severe headaches with the preparation process and is seeking a physician who understands her specific needs, considering a recommendation from her brother.    She reports occasional sinus issues for which she takes chlorpheniramine, considering her blood pressure. She also experiences occasional ear pressure but denies any wax buildup.    Review of Systems  ROS otherwise negative aside from what was mentioned above in HPI.    Objective     /80 (BP Location: Left arm, Patient Position: Sitting, BP Cuff Size: Adult)   Pulse 68   Ht 1.626 m (5' 4\")   Wt 60.3 kg (133 lb)   SpO2 98%   BMI 22.83 kg/m²      Current Outpatient Medications   Medication Instructions    amLODIPine (NORVASC) 5 mg, oral, Daily, for blood pressure    aspirin 81 mg chewable tablet 1 tablet, Daily    azelastine (Astelin) 137 mcg (0.1 %) nasal spray 2 times daily    cetirizine (ZYRTEC) 10 mg, Daily RT    cholecalciferol (VITAMIN D-3) 25 mcg, Daily    estradiol (Estrace) 0.01 % (0.1 mg/gram) vaginal cream One quarter applicator inserted into vagina 3 times per week followed by a pea-sized amount applied topically to perineum.    fluticasone (Flonase) 50 mcg/actuation nasal spray 2 sprays, Daily    hydroCHLOROthiazide (MICROZIDE) 12.5 mg, oral, Daily    lisinopril 20 mg, oral, Daily    MAGNESIUM ORAL 336 mg    magnesium oxide-Mg AA chelate (Magnesium, oxide/AA chelate,) 300 mg capsule Take by mouth.       Physical Exam  GENERAL: Alert, cooperative, well developed, no acute distress.  HEENT: Normocephalic, normal oropharynx, moist mucous membranes, ears normal, no cerumen impaction.  NECK: Thyroid normal to palpation.  CHEST: Clear to auscultation bilaterally, no wheezes, rhonchi, or crackles.  CV: Normal heart rate " and rhythm, S1 and S2 normal without murmurs.  ABDOMEN: Soft, non-tender, non-distended.  SKIN: No rashes or lesions.  NEURO: Cranial nerves grossly intact, moves all extremities without gross motor impairment, normal ROM.    Results  LABS  Non-HDL Cholesterol: 137 mg/dL  HDL Cholesterol: 108 mg/dL  Total Cholesterol: 200 mg/dL    RADIOLOGY  Calcium Score: 0    Assessment & Plan  Hypertension  Blood pressure is well-controlled at home, averaging 130/80 mmHg, but elevated in the office due to anxiety. She is on multiple antihypertensive medications, which are effective. Regular physical activity, including Randi and square dancing, supports cardiovascular health.  - Continue current antihypertensive medications    Hyperlipidemia  Cholesterol levels have improved, with non-LDL cholesterol decreasing from 140 to 137. The LDL to HDL ratio is favorable due to high HDL levels. A previous CT scan showed a calcium score of zero, indicating no arterial calcification. Total cholesterol is 200, with HDL at 108. Her active lifestyle and balanced diet, including lean meats and fruits, contribute to a favorable lipid profile.  - Continue current diet and exercise regimen  - Monitor cholesterol levels regularly    Leg cramps  Experiences nocturnal leg cramps, particularly in the right leg, unrelated to physical activity. Possible causes include arterial fibromuscular dysplasia or varicose veins. Increased hydration and magnesium supplementation at bedtime may alleviate cramps and improve sleep quality.  - Increase water intake  - Consider taking magnesium at bedtime    Colonoscopy preparation intolerance  Has difficulty with colonoscopy preparation, experiencing dehydration and severe headaches due to a tortuous colon.   - Refer for colonoscopy    General Health Maintenance  Due for a mammogram, last performed in October 2022. Regular physical activity, including Randi and square dancing, provides physical and mental benefits.  Vitamin D supplementation has improved her vitamin D levels, energy, and nail health.  - Order mammogram  - Continue vitamin D supplementation  - Encourage continued physical activity        Zora Henriquez, DO     This medical note was created with the assistance of artificial intelligence (AI) for documentation purposes. The content has been reviewed and confirmed by the healthcare provider for accuracy and completeness. Patient consented to the use of audio recording and use of AI during their visit.

## 2025-04-10 NOTE — PROGRESS NOTES
PHYSICAL THERAPY TREATMENT NOTE    Patient Name:  Siobhan Escobar   Patient MRN: [id number]  Date: 04/10/25  Time Calculation  Start Time: 0115  Stop Time: 0150  Time Calculation (min): 35 min      Problem List Items Addressed This Visit             ICD-10-CM    Cervical strain, acute, initial encounter S16.1XXA    Trapezius strain, right, initial encounter - Primary S46.811A     Insurance:  Visit number: 12of 50  Insurance Type: Payor: ANTHEM / Plan: ANTHEM HMP / Product Type: *No Product type* /   Authorization or Plan of Care date Range:$35 COPAY 50 PT/OT/ST V PCY 0 USED NO AUTH NEEDES PAYS %     General:  Reason for visit: Chronic episodic localized Right sided neck pain and limited neck R Rot,  R SB, and Extension.  Negative radiculopathy cluster.  R shoulder cleared  xray R shoulder is WNL  Referred by: Dr Tashi Novak MD appt: Zora Henriquez 4/10/25 for physical.    Precautions:  Skin CA, CAD, HTN, vascular disease, anemia, OA of hands. Fibromuscular dysplasia - Abnormal cell growth in arteries - seeing MD about this.  Fall Risk: None    Subjective:   Siobhan reports she feels like her condition is improving. Neck is doing much better.  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  This may hav contributed to less pain.   She continues to [employer] and also has been square dancing 2 days/week on average.   Patient reports Reduced frequency of pain. Reduced intensity of pain. Reduced pain level.   Pain (0-10): 0    HEP adherence / understanding: patient reports compliance with the instructed home exercises.    Assessment:   Education: Reviewed home exercise program.  Progress towards functional goals:  as noted below.  Good progress.    Justification for continued skilled care: No further PT is needed at this time.    Plan:  Discharge.  If patient requires therapy she can call  to schedule.    Objective:     RECALLED  - POSTURE:   Posture assessment positive for: forward head shoulders rounded forward scapular protraction Increased thoracic kyphotic  Protruded head: Yes;     - DERMATIOMES UE:   Patient denies altered sensation in the UE extremities.  Dermatomes of the Upper Extremity are intact to light touch.    - PALPATION:   Siobhan's neck palpation exam findings consist of Right, Upper Trapezius, and Levator.    - NECK AROM MOVEMENT LOSS:  Retraction: Minimal  Extension: Moderate  Sidebending(R): Moderate.  Ipsilateral pain.  Sidebending (L): Minimal.  Contralateral stretch.  Rotation (R): Minimal  Rotation (L): Nil    - UPPER EXTREMITY MUSCLE STRENGTH:  Upper Extremity Myotomes are WNL bilaterally    Additional Special Testing:  Special Test Cervical: .  Neck distraction: negative  Spurling R/L:  negative / negative       GOALS:   Short Term Goals:   -Patient to be Indep with Home Exercise Program for symptom management.  She has not been HEP complaint recently.  12/19/24: MET  4/10/25: she is exercising regularly.  Not specific neck exercises, but she is is exercising.     Long Term Goals:   -NDI: 0-10% impairment to indicate a significant improvement in overall function.    9/12: 14%   12/19/24: Not reassessed.  4/10/25: MET 4%     -DNF endurance test = 10 seconds to allow for correct cervical mechanics.  12/19/24: Not reassessed.  4/10/25:      -Patient will demonstrate correct posture with min to no cueing to allow for correct loading strategy.  9/12: MET     -Patient will demo mild to no limitation AROM of the cervical spine to allow for correct mechanics with functional mobility.    Continues to have ROM limitation in R Rot and R SB  12/19/24: R rotation is slightly limited with associated pain.  4/10/25:      -Patient will report driving without pain to allow for return to work and ADLs without limitation.   9/12: R Rot is painful.    12/19/24: improved post session.  4/10/25:  "MET     -Patient will report reading >30 min without pain to allow for return to work and ADLs without limitation.   9/12/24: neck pain is stable with work activities.  12/19/24: modifies activities at home.  Good Progress.  4/10/25: MET    Treatment Performed: (\"NP\" = Not Performed)     Therapeutic Exercise:     35 minutes  Access Code: [email or web address]  UBE 2/2 - NP    **ACTIVITIES BELOW WERE NOT PERFORMED**   Seated chin tuck: increases pain  Seated SB isometrics 5\" x 10   Supine chin nod - feels awkward to patient    Seated ext using towel roll, \"rabbit ears\"    Seated Thoracic Lumbar Extension  - 10 reps - 3-5 second hold  Seated Levator Scapulae Stretch  - 7 x weekly - 3 reps - 30 seconds hold  Seated Upper Trapezius Stretch  -  3 reps - 30 second hold  Seated Assisted Cervical Rotation with Towel  - 1 sets - 5 reps - 5 second hold  Tband Row/LAE/ER/Horiz Abd 2 x 10 ea      Manual Therapy:       minutes  Seated STM/DTM to the R  UT/cervical paraspinals  Suboccipital release  Prone UPA R and L C4 - T2  IASTM to the R UT - NP.  Dry needling: bilateral segments C6/7, R UT, R levator - Discontinued - patient requested no dry needling.    Neuromuscular Re-education:      minutes      Gait Training:            minutes      Aquatics:            minutes      Therapeutic Activity:      minutes      Gait Training:            minutes      Modalities:       Vasopneumatic Device       minutes  Electrical Stimulation          minutes  Ultrasound            minutes  Iontophoresis                     minutes  Cold Pack            minutes  Mechanical Traction           minutes    Self Care Home Management:    minutes    Canalith Reposition:          minutes     Education:          minutes    Other:       minutes      Evaluation Complexity: Low:   minutes; Moderate   minutes; Complex   minutes    Re-Evaluation:   minutes    .[email or web address]     "

## 2025-04-15 ENCOUNTER — APPOINTMENT (OUTPATIENT)
Dept: PRIMARY CARE | Facility: CLINIC | Age: 65
End: 2025-04-15
Payer: COMMERCIAL

## 2025-07-01 ENCOUNTER — OFFICE VISIT (OUTPATIENT)
Dept: URGENT CARE | Age: 65
End: 2025-07-01
Payer: COMMERCIAL

## 2025-07-01 VITALS
OXYGEN SATURATION: 98 % | SYSTOLIC BLOOD PRESSURE: 170 MMHG | RESPIRATION RATE: 16 BRPM | TEMPERATURE: 98.4 F | WEIGHT: 130 LBS | DIASTOLIC BLOOD PRESSURE: 81 MMHG | HEIGHT: 64 IN | HEART RATE: 78 BPM | BODY MASS INDEX: 22.2 KG/M2

## 2025-07-01 DIAGNOSIS — J06.9 VIRAL URI: ICD-10-CM

## 2025-07-01 DIAGNOSIS — L29.9 EAR ITCH: Primary | ICD-10-CM

## 2025-07-01 RX ORDER — FLUTICASONE PROPIONATE 50 MCG
1 SPRAY, SUSPENSION (ML) NASAL DAILY
Qty: 16 G | Refills: 0 | Status: SHIPPED | OUTPATIENT
Start: 2025-07-01 | End: 2026-07-01

## 2025-07-01 ASSESSMENT — ENCOUNTER SYMPTOMS
SORE THROAT: 1
COUGH: 0
FEVER: 0

## 2025-07-01 NOTE — PATIENT INSTRUCTIONS
Start antihistamine like claratin, zyrtec or allegra daily   Flonase nightly in each nostril   Return if new or worsening symptoms

## 2025-07-01 NOTE — PROGRESS NOTES
"Subjective   Patient ID: Siobhan Escobar is a 64 y.o. female. They present today with a chief complaint of Ear Problem (Itchy ears and pressure with some vertigo x 1 week).    History of Present Illness  Patient is a 64 year old female presenting for ear concern. She reports she started getting sick last week with sore throat and fatigue. She took a covid test which was negative. Sore throat improving but for the last few days has had worsening ear itching. Tried putting some olive oil in her ears earlier to help without relief. She is otherwise not taking any medications including antihistamine or nasal spray. No recent swimming.       History provided by:  Patient   used: No        Past Medical History  Allergies as of 07/01/2025 - Reviewed 07/01/2025   Allergen Reaction Noted    Meperidine Other 08/01/2023    Meperidine (pf) Other 07/01/2011       Prescriptions Prior to Admission[1]     Medical History[2]    Surgical History[3]     reports that she has never smoked. She has never used smokeless tobacco. She reports current alcohol use. She reports that she does not use drugs.    Review of Systems  Review of Systems   Constitutional:  Negative for fever.   HENT:  Positive for sore throat. Negative for congestion, ear discharge and ear pain.    Respiratory:  Negative for cough.                                   Objective    Vitals:    07/01/25 1548   BP: 170/81   Pulse: 78   Resp: 16   Temp: 36.9 °C (98.4 °F)   SpO2: 98%   Weight: 59 kg (130 lb)   Height: 1.626 m (5' 4\")     No LMP recorded. Patient is postmenopausal.    Physical Exam  Constitutional:       General: She is not in acute distress.     Appearance: Normal appearance. She is normal weight. She is not ill-appearing, toxic-appearing or diaphoretic.   HENT:      Head: Normocephalic. No right periorbital erythema or left periorbital erythema.      Jaw: There is normal jaw occlusion. No trismus.      Right Ear: Tympanic membrane, ear canal " and external ear normal. No swelling. No middle ear effusion. There is no impacted cerumen. Tympanic membrane is not erythematous or bulging.      Left Ear: Tympanic membrane, ear canal and external ear normal. No swelling.  No middle ear effusion. There is no impacted cerumen. Tympanic membrane is not erythematous or bulging.      Mouth/Throat:      Mouth: Mucous membranes are moist.      Pharynx: Oropharynx is clear. Uvula midline. No pharyngeal swelling, oropharyngeal exudate, posterior oropharyngeal erythema, uvula swelling or postnasal drip.      Tonsils: No tonsillar exudate or tonsillar abscesses.   Eyes:      General: No scleral icterus.        Right eye: No discharge.         Left eye: No discharge.      Conjunctiva/sclera: Conjunctivae normal.   Neck:      Trachea: Phonation normal.   Cardiovascular:      Rate and Rhythm: Normal rate and regular rhythm.      Heart sounds: No murmur heard.     No friction rub. No gallop.   Pulmonary:      Effort: Pulmonary effort is normal. No respiratory distress.      Breath sounds: Normal breath sounds. No stridor. No wheezing, rhonchi or rales.   Neurological:      Mental Status: She is alert.   Psychiatric:         Mood and Affect: Mood normal.         Behavior: Behavior normal.         Thought Content: Thought content normal.         Procedures    Point of Care Test & Imaging Results from this visit  No results found for this visit on 07/01/25.   Imaging  No results found.    Cardiology, Vascular, and Other Imaging  No other imaging results found for the past 2 days      Diagnostic study results (if any) were reviewed by Isabel Kent PA-C.    Assessment/Plan   Allergies, medications, history, and pertinent labs/EKGs/Imaging reviewed by Isabel Kent PA-C.     Medical Decision Making  Patient is a 64 year old female presenting with ear concern. Hemodynamically stable. Nontoxic appearing, no meningismus. Posterior oropharynx clear, no exudates or vesicular  lesions. Uvula is midline and there is no asymmetrical swelling of tonsils, drooling, trismus or muffled voice to suggest RPA or PTA. TM without erythema or bulging to suggest otitis media. No swelling, debris or lesions to bilateral ear canals. Lungs clear to auscultation, low suspicion for pneumonia. Suspect viral URI versus seasonal allergies. Discussed antihistamines and flonase nasal spray. Discussed supportive care, OTC medications as needed, tylenol/ibuprofen for pain or fever, rest, fluids.     At time of discharge, patient was clinically well-appearing and appropriate for outpatient management. The patient/parent/guardian was educated regarding diagnosis, supportive care, OTC and Rx medications. The patient/parent/guardian was given the opportunity to ask questions prior to discharge. They verbalized understanding of discussion of treatment plan, expected course of illness and/or injury, indications on when to return to , when to seek further evaluation in ED/call 911, and the need to follow up with PCP and/or specialist as referred. Patient/parent/guardian was provided with work/school documentation if requested. Patient stable upon discharge.     Orders and Diagnoses  Diagnoses and all orders for this visit:  Ear itch  -     fluticasone (Flonase) 50 mcg/actuation nasal spray; Administer 1 spray into each nostril once daily. Shake gently. Before first use, prime pump. After use, clean tip and replace cap.  Viral URI      Medical Admin Record      Patient disposition: Home    Electronically signed by Isabel Kent PA-C  5:09 PM           [1] (Not in a hospital admission)   [2]   Past Medical History:  Diagnosis Date    Acute recurrent pansinusitis     Acute recurrent pansinusitis    Acute recurrent pansinusitis 03/23/2018    Acute recurrent pansinusitis    Acute upper respiratory infection, unspecified 12/21/2021    Viral URI with cough    Chondrocostal junction syndrome (tietze) 01/23/2016     Costochondritis, acute    Conjunctival hemorrhage of left eye 12/08/2011    Decreased white blood cell count, unspecified 05/28/2020    Leukopenia    Encounter for immunization 05/20/2021    Encounter for immunization    Encounter for other screening for malignant neoplasm of breast 02/28/2019    Screening for breast cancer    Encounter for screening for cardiovascular disorders 02/28/2019    Screening for hypertension    Perirectal skin irritation 08/01/2023    Personal history of other diseases of the digestive system 03/27/2019    History of constipation    Personal history of other drug therapy 02/28/2019    History of influenza vaccination    Personal history of other infectious and parasitic diseases 05/18/2020    History of viral gastroenteritis    Personal history of other infectious and parasitic diseases 07/02/2020    History of viral disease    Personal history of other specified conditions 09/03/2019    History of fatigue    Personal history of other specified conditions 07/02/2020    History of diarrhea    Rash and other nonspecific skin eruption 08/08/2021    Rash    Unspecified otitis externa, left ear 07/17/2018    Left otitis externa    Unspecified symptoms and signs involving the genitourinary system 10/18/2021    UTI symptoms   [3] No past surgical history on file.

## 2025-07-25 ENCOUNTER — OFFICE VISIT (OUTPATIENT)
Dept: OTOLARYNGOLOGY | Facility: CLINIC | Age: 65
End: 2025-07-25
Payer: COMMERCIAL

## 2025-07-25 DIAGNOSIS — L29.9 EXTERNAL AUDITORY CANAL PRURITUS: ICD-10-CM

## 2025-07-25 DIAGNOSIS — H61.23 BILATERAL IMPACTED CERUMEN: Primary | ICD-10-CM

## 2025-07-25 PROCEDURE — 1160F RVW MEDS BY RX/DR IN RCRD: CPT | Performed by: NURSE PRACTITIONER

## 2025-07-25 PROCEDURE — 99213 OFFICE O/P EST LOW 20 MIN: CPT | Performed by: NURSE PRACTITIONER

## 2025-07-25 PROCEDURE — 1159F MED LIST DOCD IN RCRD: CPT | Performed by: NURSE PRACTITIONER

## 2025-07-25 PROCEDURE — 1036F TOBACCO NON-USER: CPT | Performed by: NURSE PRACTITIONER

## 2025-07-25 NOTE — PATIENT INSTRUCTIONS
Today you were evaluated by Lali Del Toro CNP.    Please follow-up as needed. If you have any questions or concerns, please contact my office at [phone].     Please contact our scheduling and  service at [phone]. They will work with you to get your test, imaging, or other appointments scheduled that might have been ordered.     Nesha Salguero CNP.

## 2025-07-25 NOTE — PROGRESS NOTES
Subjective   Patient ID: Siobhan Escobar is a 64 y.o. female who presents for Follow-up (Slight Cough) and Ear Problem (Itchy ears).  HPI  7/25/25- Patient presents for follow-up. She expresses concerns about her ears. She notes that her ears feel itchy all the time. She feels that this has been present for at least a month. She does have seasonal allergies. She denies any changes to her hearing.     Review of Systems  Review of systems is negative for constitutional, ophthalmological, cardiac, pulmonary, renal, gastrointestinal, musculoskeletal, mental health, endocrine, or neurologic disorders (except as listed in the HPI, PMH, and Problem List).     Objective   Physical Exam  CONSTITUTIONAL: Patient appears well developed and well nourished.   GENERAL: this is a healthy appearing female who appears stated age. The patient is alert and appropriately verbally conversant without hoarseness. This patient is in no apparent distress.   FACE: The face was inspected and no cutaneous masses or lesions were visualized. There was no erythema or edema noted. Facial movement was symmetric. No skin lesions were detected.   EYES: Extra-ocular muscle function was intact. No nystagmus was observed. Pupils were equal.   NOSE: Examination of the nose revealed the nasal dorsum to be midline. Intranasal exam reveals the septum is midline. The inferior turbinates were healthy. No masses, polyps, mucopus, or other lesion on anterior rhinoscopy. See below procedure note as applicable for further exam.  EARS: Examination of the ears revealed that the auricles were normally formed with no lesions. The external auditory canals were obstructed with cerumen bilaterally.   NECK: Visualization and palpation of the neck revealed no mass lesions. No skin lesions or inflammatory processes were detected. The cervical musculature was normal to palpation.   CERVICAL LYMPHATICS: There were no palpable lymph nodes in the posterior triangle,  submandibular triangle, jugulodigastric region, or central neck.  RESPIRATORY: Normal inspiration and expiration and chest wall expansion, no use of accessory muscles to breathe, no stridor.  NEUROLOGICAL: Patient is ambulatory without assist. Mentation is clear. Answering questions appropriately.     Assessment/Plan     Assessment:  61 year old female with symptoms and clinical findings consistent with chronic rhinitis, inferior turbinate hypertrophy and a left nasal septal deviation. Rhinitis potentially secondary to viral illness versus allergies. No purulence on nasal endoscopy. Rec. irrigations, fluticasone, and azelastine.   8/26/22- Doing very well with use of azelastine as needed. Rec. continue. Will see otology regarding hard cerumen on her left TM.   4/14/23- Recurrence of symptoms over the last week. Rec. resume fluticasone, azelastine, gel. Will obtain allergy panel.  7/25/25- Cerumen bilaterally. Refer to otology.        Plan:  1. Anterior exam: no bleeding  I discussed the findings the patient and offered reassurance and counseling.  We agreed to proceed with therapeutic measures to address the issues noted above.   2. We will have the patient see otology for removal of cerumen bilaterally.   3. Patient will follow-up as needed.  All questions were answered and patient agrees with established plan of care.

## 2025-08-04 ENCOUNTER — APPOINTMENT (OUTPATIENT)
Dept: OTOLARYNGOLOGY | Facility: CLINIC | Age: 65
End: 2025-08-04
Payer: COMMERCIAL

## 2025-08-04 VITALS — BODY MASS INDEX: 22.31 KG/M2 | WEIGHT: 130 LBS

## 2025-08-04 DIAGNOSIS — H60.8X9 CHRONIC ECZEMATOUS OTITIS EXTERNA, UNSPECIFIED LATERALITY: ICD-10-CM

## 2025-08-04 DIAGNOSIS — H61.23 EXCESSIVE CERUMEN IN BOTH EAR CANALS: Primary | ICD-10-CM

## 2025-08-04 PROCEDURE — 99203 OFFICE O/P NEW LOW 30 MIN: CPT | Performed by: GENERAL PRACTICE

## 2025-08-04 PROCEDURE — 1159F MED LIST DOCD IN RCRD: CPT | Performed by: GENERAL PRACTICE

## 2025-08-04 RX ORDER — FLUOCINOLONE ACETONIDE 0.11 MG/ML
2 OIL AURICULAR (OTIC) 2 TIMES DAILY
Qty: 5 ML | Refills: 1 | Status: SHIPPED | OUTPATIENT
Start: 2025-08-04 | End: 2025-08-12

## 2025-08-04 NOTE — PROGRESS NOTES
Otolaryngology - Head and Neck Surgery Outpatient New Patient Visit Note        Assessment/Plan:   Problem List Items Addressed This Visit    None  Visit Diagnoses         Codes      Excessive cerumen in both ear canals    -  Primary H61.23      Chronic eczematous otitis externa, unspecified laterality     H60.8X9    Relevant Medications    fluocinolone (DermOtic) 0.01 % ear drops            Cerumen obstruction b/l, debrided.  Some chronic itching, trial dermotic for possible eczematous OE.           Follow up:  -plan for follow up in clinic as needed    All of the above findings, impressions, treatment planning and follow up plans were discussed with the patient who indicated understanding.  the patient was instructed to contact or return to clinic sooner if symptoms/signs persist or worsen despite the above management.      Jone Ng MD  Otolaryngology - Head and Neck Surgery            History Of Present Illness  Siobhan Escobar is a 64 y.o. female presenting for cerumen.    The patient reports a history of ear wax buildup requiring debridement, usually every 6-12mo.    The pt reports gradual return of ear canal fullness and plugged sensation.  Reports some muffled hearing.    Denies otalgia, otorrhea.    Denies recent significant history of otitis media or externa.    Denies prior significant history of otologic surgery or trauma.     Notes some chronic ear canal itching b/l.               Past Medical History  She has a past medical history of Acute recurrent pansinusitis, Acute recurrent pansinusitis (03/23/2018), Acute upper respiratory infection, unspecified (12/21/2021), Chondrocostal junction syndrome (tietze) (01/23/2016), Conjunctival hemorrhage of left eye (12/08/2011), Decreased white blood cell count, unspecified (05/28/2020), Encounter for immunization (05/20/2021), Encounter for other screening for malignant neoplasm of breast (02/28/2019), Encounter for screening for cardiovascular disorders  (02/28/2019), Perirectal skin irritation (08/01/2023), Personal history of other diseases of the digestive system (03/27/2019), Personal history of other drug therapy (02/28/2019), Personal history of other infectious and parasitic diseases (05/18/2020), Personal history of other infectious and parasitic diseases (07/02/2020), Personal history of other specified conditions (09/03/2019), Personal history of other specified conditions (07/02/2020), Rash and other nonspecific skin eruption (08/08/2021), Unspecified otitis externa, left ear (07/17/2018), and Unspecified symptoms and signs involving the genitourinary system (10/18/2021).    Surgical History  She has no past surgical history on file.     Social History  She reports that she has never smoked. She has never used smokeless tobacco. She reports current alcohol use. She reports that she does not use drugs.    Family History  Family History[1]     Allergies  Meperidine and Meperidine (pf)    Review of Systems  ROS: Pertinent positives as noted in HPI.    - CONSTITUTIONAL: Does not report weight loss, fever or chills.    - HEENT:   Ear: Does not report tinnitus, vertigo,    , otalgia, otorrhea  Nose: Does not report congestion, rhinorrhea, epistaxis, decreased smell  Throat: Does not report pain, dysphagia, odynophagia  Larynx: Does not report hoarseness,  difficulty breathing, pain with speaking (odynophonia)  Neck: Does not report new masses, pain, swelling  Face: Does not report sinus pain, pressure, swelling, numbness, weakness     - RESPIRATORY: Does not report SOB or cough.    - CV: Does not report palpitations or chest pain.     - GI: Does not report abdominal pain, nausea, vomiting or diarrhea.    - : Does not report dysuria or urinary frequency.    - MSK: Does not report myalgia or joint pain.    - SKIN: Does not report rash or pruritus.    - NEUROLOGICAL: Does not report headache or syncope.    - PSYCHIATRIC: Does not report recent changes in mood.  Does not report anxiety or depression.         Physical Exam:     GENERAL:   Alert & Oriented to person, place and time; Normal affect and appearance. Well developed and well nourished. Conversant & cooperative with examination.     HEAD:   Normocephalic, atraumatic. No sinus tenderness to palpation. Normal parotid bilaterally. Normal facial strength.     NEUROLOGIC:   Cranial nerves II-XII grossly intact, gait WNL. Normal mood and affect.    EYES:   Extraocular movements intact. Pupils equal, round, reactive to light and accommodation. No nystagmus, no ptosis. no scleral injection.    EAR:   Normal auricle. No discomfort or TTP with manipulation.   Handheld otoscopic exam showed normal external auditory canals bilaterally. No purulence or EAC inflammation. Cerumen obstruction b/l, debrided with forceps/suction.   Right tympanic membrane clear and mobile without evidence of perforation, retraction or middle ear effusion.   Left tympanic membrane clear and mobile without evidence of perforation, retraction or middle ear effusion.     NOSE:   No external deformity. No external nasal lesions, lacerations, or scars. Nasal tip symmetrical with normal nasal valves.   Nasal cavity with essentially midline septum, normal mucosa and turbinates. No lesions, masses, purulence or polyps.     OC/OP:   Mucous membranes moist, no masses, lesions or exudates.   Normal tongue, floor of mouth, teeth, gums, lips. Normal posterior pharyngeal wall.    Normal tonsils without erythema, exudate or obvious calculi     NECK:   No neck masses or thyroid enlargement. Trachea midline. No tenderness to palpation    LYMPHATIC:   No cervical lymphadenopathy.     RESPIRATORY:   Symmetric chest elevation & no retractions. No significant hoarseness. No increased work of breathing.    CV:   No clubbing or cyanosis. No obvious edema    Skin:   No facial rashes, vesicles or lesions.     Extremities:   No gross abnormalities      Clinic  Procedure  Binocular microscopy exam  Indication: tympanic membrane(s) could not be visualized adequately with handheld otoscopy.   Location:  bilateral ears  Visualization Instrument: A microscope was used to visualize through a speculum placed in the ear canal(s) to visualize the ear canal, tympanic membranes and to assist in assessment and removal of debris.  Findings:  see physical exam documentation  Patient Status: The patient tolerated the procedure well.   Complications: There were no complications.       Information review:  External sources (notes, imaging, lab results) listed below personally reviewed to aid in medical decision making process.  -  -  -         [1]   Family History  Problem Relation Name Age of Onset    No Known Problems Mother

## 2025-08-08 ENCOUNTER — APPOINTMENT (OUTPATIENT)
Dept: PRIMARY CARE | Facility: CLINIC | Age: 65
End: 2025-08-08
Payer: COMMERCIAL

## 2025-08-08 VITALS
HEIGHT: 64 IN | DIASTOLIC BLOOD PRESSURE: 78 MMHG | WEIGHT: 134 LBS | BODY MASS INDEX: 22.88 KG/M2 | HEART RATE: 75 BPM | OXYGEN SATURATION: 100 % | SYSTOLIC BLOOD PRESSURE: 150 MMHG

## 2025-08-08 DIAGNOSIS — I77.3 ARTERIAL FIBROMUSCULAR DYSPLASIA: ICD-10-CM

## 2025-08-08 DIAGNOSIS — L65.9 HAIR LOSS: Primary | ICD-10-CM

## 2025-08-08 DIAGNOSIS — I10 PRIMARY HYPERTENSION: ICD-10-CM

## 2025-08-08 PROCEDURE — 99214 OFFICE O/P EST MOD 30 MIN: CPT | Performed by: STUDENT IN AN ORGANIZED HEALTH CARE EDUCATION/TRAINING PROGRAM

## 2025-08-08 PROCEDURE — 1160F RVW MEDS BY RX/DR IN RCRD: CPT | Performed by: STUDENT IN AN ORGANIZED HEALTH CARE EDUCATION/TRAINING PROGRAM

## 2025-08-08 PROCEDURE — 3078F DIAST BP <80 MM HG: CPT | Performed by: STUDENT IN AN ORGANIZED HEALTH CARE EDUCATION/TRAINING PROGRAM

## 2025-08-08 PROCEDURE — 3077F SYST BP >= 140 MM HG: CPT | Performed by: STUDENT IN AN ORGANIZED HEALTH CARE EDUCATION/TRAINING PROGRAM

## 2025-08-08 PROCEDURE — 1159F MED LIST DOCD IN RCRD: CPT | Performed by: STUDENT IN AN ORGANIZED HEALTH CARE EDUCATION/TRAINING PROGRAM

## 2025-08-08 PROCEDURE — 3008F BODY MASS INDEX DOCD: CPT | Performed by: STUDENT IN AN ORGANIZED HEALTH CARE EDUCATION/TRAINING PROGRAM

## 2025-08-08 RX ORDER — DILTIAZEM HYDROCHLORIDE 120 MG/1
120 CAPSULE, COATED, EXTENDED RELEASE ORAL DAILY
Qty: 30 CAPSULE | Refills: 5 | Status: SHIPPED | OUTPATIENT
Start: 2025-08-08 | End: 2026-02-04

## 2025-08-08 ASSESSMENT — PATIENT HEALTH QUESTIONNAIRE - PHQ9
1. LITTLE INTEREST OR PLEASURE IN DOING THINGS: NOT AT ALL
2. FEELING DOWN, DEPRESSED OR HOPELESS: NOT AT ALL
SUM OF ALL RESPONSES TO PHQ9 QUESTIONS 1 AND 2: 0
2. FEELING DOWN, DEPRESSED OR HOPELESS: NOT AT ALL
SUM OF ALL RESPONSES TO PHQ9 QUESTIONS 1 AND 2: 0
1. LITTLE INTEREST OR PLEASURE IN DOING THINGS: NOT AT ALL

## 2025-08-08 NOTE — PROGRESS NOTES
Subjective   Patient ID: Siobhan Escobar is a 64 y.o. female who presents for Allergic Reaction (Patient is worried that lisinopril is causing hair loss for her. Out of all of her siblings ( all who have high bp and who are on medications) she is the only one who has thinning hair problems and her mother did not have issues with thinning hair either. She would like to see about trying something different in hopes it helps with hair thinning. She's also experiencing a cough from it ).  History of Present Illness  Siobhan Escobar is a 64 year old female with hypertension who presents with concerns about hair thinning potentially related to her medication.    She has been experiencing gradual hair thinning, particularly noticeable in her hair parting. Her  has observed hair loss during vacuuming, although she does not notice significant hair loss in her brush. She has been taking lisinopril, an ACE inhibitor, since 2021 and suspects it may be linked to her hair thinning. She has tried using collagen supplements to address the issue.    She has a history of hypertension and has been on amlodipine for a long time before starting lisinopril. Her blood pressure readings at home are generally around 133/78 mmHg, but they tend to be higher during doctor visits. She wants to explore alternative medications due to her concerns about hair loss and mentions previous issues with ankle swelling when her amlodipine dose was increased.    She experiences a dry cough in the morning, which she associates with lisinopril, lasting about half an hour. She also mentions a recent episode of soreness in her leg, which she initially thought to be a bruise.    She recently visited an ear doctor for ear wax buildup, which was successfully removed, alleviating her symptoms of itchiness. She has a history of seeing a cardiologist annually and plans to schedule a follow-up appointment. She recalls a previous suggestion to take losartan but  "expresses concern about potential kidney issues, although her potassium levels are monitored regularly.    She participates in square dancing and swing dancing.    Review of Systems  ROS otherwise negative aside from what was mentioned above in HPI.    Objective     /78 (BP Location: Right arm, Patient Position: Sitting, BP Cuff Size: Adult)   Pulse 75   Ht 1.626 m (5' 4\")   Wt 60.8 kg (134 lb)   SpO2 100%   BMI 23.00 kg/m²      Current Outpatient Medications   Medication Instructions    ascorbic acid/collagen hydr (COLLAGEN SKIN RENEWAL ORAL) Take by mouth.    aspirin 81 mg chewable tablet 1 tablet, Daily    azelastine (Astelin) 137 mcg (0.1 %) nasal spray 2 times daily    cetirizine (ZYRTEC) 10 mg, Daily RT    cholecalciferol (VITAMIN D-3) 25 mcg, Daily    dilTIAZem CD (CARDIZEM CD) 120 mg, oral, Daily    fluocinolone (DermOtic) 0.01 % ear drops 2 drops, Each Ear, 2 times daily    fluticasone (Flonase) 50 mcg/actuation nasal spray 2 sprays, Daily    fluticasone (Flonase) 50 mcg/actuation nasal spray 1 spray, Each Nostril, Daily, Shake gently. Before first use, prime pump. After use, clean tip and replace cap.    MAGNESIUM ORAL 336 mg    magnesium oxide-Mg AA chelate (Magnesium, oxide/AA chelate,) 300 mg capsule Take by mouth.       Physical Exam  GENERAL: Alert, cooperative, well developed, no acute distress.  HEENT: Normocephalic  CV: Normal heart rate and rhythm, S1 and S2 normal without murmurs.  NEURO: Cranial nerves grossly intact, moves all extremities without gross motor impairment, normal ROM.    Results  LABS  Cholesterol: within normal limits (04/2025)    Assessment & Plan  Hypertension management and antihypertensive medication adjustment  She reports a blood pressure reading of 133/78 mmHg at home but elevated readings during office visits. She is concerned about hair loss and a dry cough, which she attributes to lisinopril, and is interested in changing her medication.  - Discontinue " lisinopril and amlodipine.  - Initiate diltiazem 120 mg once daily, long-acting formulation.  - Monitor blood pressure at home daily or every other day.  - Follow up in 4-6 weeks to assess blood pressure control and medication tolerance.  - Discussed potential side effects of diltiazem, including lightheadedness if blood pressure drops too low.  - Informed that diltiazem is neutral regarding kidney protection, unlike lisinopril, which is kidney protective.    Hair thinning evaluation and possible medication-induced alopecia  She reports gradual hair thinning, suspected to be linked to lisinopril, which she has been taking since 2021. She is exploring medication-induced alopecia and considering alternative antihypertensive medications.  - Discontinue lisinopril to evaluate if hair thinning improves.  - Consider alternative antihypertensive medications that may not contribute to hair thinning.  - Monitor for changes in hair density after medication adjustment.    Leg pain and possible cramp evaluation  She reports soreness in her leg, initially thought to be a bruise, with no visible trauma or bruising. The pain has improved and may be related to varicose veins or a compressed nerve.  - Advise on potential nerve compression as a cause of pain.    Follow up in 1 month    Zora Henriquez DO     This medical note was created with the assistance of artificial intelligence (AI) for documentation purposes. The content has been reviewed and confirmed by the healthcare provider for accuracy and completeness. Patient consented to the use of audio recording and use of AI during their visit.

## 2025-08-12 ENCOUNTER — APPOINTMENT (OUTPATIENT)
Dept: DERMATOLOGY | Facility: CLINIC | Age: 65
End: 2025-08-12
Payer: COMMERCIAL

## 2025-08-12 DIAGNOSIS — L21.9 SEBORRHEIC DERMATITIS: ICD-10-CM

## 2025-08-12 DIAGNOSIS — L60.9 NAIL DISORDER: ICD-10-CM

## 2025-08-12 DIAGNOSIS — L64.9 ANDROGENIC ALOPECIA: Primary | ICD-10-CM

## 2025-08-12 DIAGNOSIS — L63.9 ALOPECIA AREATA: ICD-10-CM

## 2025-08-12 PROCEDURE — 99213 OFFICE O/P EST LOW 20 MIN: CPT | Performed by: DERMATOLOGY

## 2025-08-12 PROCEDURE — 1160F RVW MEDS BY RX/DR IN RCRD: CPT | Performed by: DERMATOLOGY

## 2025-08-12 PROCEDURE — 1159F MED LIST DOCD IN RCRD: CPT | Performed by: DERMATOLOGY

## 2025-08-12 ASSESSMENT — PATIENT GLOBAL ASSESSMENT (PGA): PATIENT GLOBAL ASSESSMENT: PATIENT GLOBAL ASSESSMENT:  1 - CLEAR

## 2025-08-12 ASSESSMENT — DERMATOLOGY PATIENT ASSESSMENT
ARE YOU AN ORGAN TRANSPLANT RECIPIENT: NO
HAVE YOU HAD OR DO YOU HAVE VASCULAR DISEASE: NO
DO YOU HAVE IRREGULAR MENSTRUAL CYCLES: NO
DO YOU USE SUNSCREEN: OCCASIONALLY
ARE YOU ON BIRTH CONTROL: NO
ARE YOU TRYING TO GET PREGNANT: NO
HAVE YOU HAD OR DO YOU HAVE A STAPH INFECTION: NO
DO YOU HAVE ANY NEW OR CHANGING LESIONS: NO
DO YOU USE A TANNING BED: NO

## 2025-08-12 ASSESSMENT — ITCH NUMERIC RATING SCALE: HOW SEVERE IS YOUR ITCHING?: 0

## 2025-08-12 ASSESSMENT — DERMATOLOGY QUALITY OF LIFE (QOL) ASSESSMENT
RATE HOW BOTHERED YOU ARE BY SYMPTOMS OF YOUR SKIN PROBLEM (EG, ITCHING, STINGING BURNING, HURTING OR SKIN IRRITATION): 0 - NEVER BOTHERED
DATE THE QUALITY-OF-LIFE ASSESSMENT WAS COMPLETED: 67429
RATE HOW BOTHERED YOU ARE BY EFFECTS OF YOUR SKIN PROBLEMS ON YOUR ACTIVITIES (EG, GOING OUT, ACCOMPLISHING WHAT YOU WANT, WORK ACTIVITIES OR YOUR RELATIONSHIPS WITH OTHERS): 0 - NEVER BOTHERED
ARE THERE EXCLUSIONS OR EXCEPTIONS FOR THE QUALITY OF LIFE ASSESSMENT: NO
RATE HOW EMOTIONALLY BOTHERED YOU ARE BY YOUR SKIN PROBLEM (FOR EXAMPLE, WORRY, EMBARRASSMENT, FRUSTRATION): 0 - NEVER BOTHERED

## 2025-08-24 ENCOUNTER — PATIENT MESSAGE (OUTPATIENT)
Dept: PRIMARY CARE | Facility: CLINIC | Age: 65
End: 2025-08-24
Payer: COMMERCIAL

## 2025-08-24 ENCOUNTER — PATIENT MESSAGE (OUTPATIENT)
Dept: DERMATOLOGY | Facility: CLINIC | Age: 65
End: 2025-08-24
Payer: COMMERCIAL

## 2025-08-24 DIAGNOSIS — I10 PRIMARY HYPERTENSION: Primary | ICD-10-CM

## 2025-08-25 RX ORDER — LISINOPRIL 20 MG/1
20 TABLET ORAL DAILY
Qty: 30 TABLET | Refills: 3 | Status: SHIPPED | OUTPATIENT
Start: 2025-08-25 | End: 2026-09-29

## 2025-09-04 ENCOUNTER — PATIENT MESSAGE (OUTPATIENT)
Dept: PRIMARY CARE | Facility: CLINIC | Age: 65
End: 2025-09-04
Payer: COMMERCIAL

## 2025-09-04 DIAGNOSIS — I10 PRIMARY HYPERTENSION: Primary | ICD-10-CM

## 2025-09-04 RX ORDER — AMLODIPINE BESYLATE 5 MG/1
5 TABLET ORAL DAILY
Qty: 90 TABLET | Refills: 1 | Status: SHIPPED | OUTPATIENT
Start: 2025-09-04 | End: 2026-03-03

## 2025-09-09 ENCOUNTER — APPOINTMENT (OUTPATIENT)
Dept: PRIMARY CARE | Facility: CLINIC | Age: 65
End: 2025-09-09
Payer: COMMERCIAL

## 2025-10-02 ENCOUNTER — APPOINTMENT (OUTPATIENT)
Dept: PRIMARY CARE | Facility: CLINIC | Age: 65
End: 2025-10-02
Payer: COMMERCIAL